# Patient Record
Sex: FEMALE | Race: WHITE | Employment: OTHER | URBAN - METROPOLITAN AREA
[De-identification: names, ages, dates, MRNs, and addresses within clinical notes are randomized per-mention and may not be internally consistent; named-entity substitution may affect disease eponyms.]

---

## 2020-05-10 ENCOUNTER — HOSPITAL ENCOUNTER (INPATIENT)
Age: 80
LOS: 3 days | Discharge: NURSING, REHAB OR LONG-TERM CARE | DRG: 065 | End: 2020-05-14
Attending: EMERGENCY MEDICINE | Admitting: INTERNAL MEDICINE
Payer: MEDICARE

## 2020-05-10 PROCEDURE — 99285 EMERGENCY DEPT VISIT HI MDM: CPT

## 2020-05-11 ENCOUNTER — APPOINTMENT (OUTPATIENT)
Dept: MRI IMAGING | Age: 80
DRG: 065 | End: 2020-05-11
Payer: MEDICARE

## 2020-05-11 ENCOUNTER — APPOINTMENT (OUTPATIENT)
Dept: CT IMAGING | Age: 80
DRG: 065 | End: 2020-05-11
Payer: MEDICARE

## 2020-05-11 ENCOUNTER — APPOINTMENT (OUTPATIENT)
Dept: GENERAL RADIOLOGY | Age: 80
DRG: 065 | End: 2020-05-11
Payer: MEDICARE

## 2020-05-11 PROBLEM — I63.9 ACUTE CEREBROVASCULAR ACCIDENT (CVA) (HCC): Status: ACTIVE | Noted: 2020-05-11

## 2020-05-11 PROBLEM — R47.01 BROCA'S APHASIA: Status: ACTIVE | Noted: 2020-05-11

## 2020-05-11 PROBLEM — E78.2 MIXED HYPERLIPIDEMIA: Status: ACTIVE | Noted: 2020-05-11

## 2020-05-11 LAB
-: NORMAL
ABSOLUTE EOS #: 0.1 K/UL (ref 0–0.4)
ABSOLUTE IMMATURE GRANULOCYTE: ABNORMAL K/UL (ref 0–0.3)
ABSOLUTE LYMPH #: 2.3 K/UL (ref 1–4.8)
ABSOLUTE MONO #: 0.5 K/UL (ref 0.1–1.3)
ALBUMIN SERPL-MCNC: 4.2 G/DL (ref 3.5–5.2)
ALBUMIN/GLOBULIN RATIO: ABNORMAL (ref 1–2.5)
ALP BLD-CCNC: 79 U/L (ref 35–104)
ALT SERPL-CCNC: 17 U/L (ref 5–33)
ANION GAP SERPL CALCULATED.3IONS-SCNC: 14 MMOL/L (ref 9–17)
ANION GAP SERPL CALCULATED.3IONS-SCNC: 15 MMOL/L (ref 9–17)
AST SERPL-CCNC: 18 U/L
BASOPHILS # BLD: 1 % (ref 0–2)
BASOPHILS ABSOLUTE: 0.1 K/UL (ref 0–0.2)
BILIRUB SERPL-MCNC: 0.65 MG/DL (ref 0.3–1.2)
BNP INTERPRETATION: ABNORMAL
BUN BLDV-MCNC: 16 MG/DL (ref 8–23)
BUN BLDV-MCNC: 17 MG/DL (ref 8–23)
BUN/CREAT BLD: ABNORMAL (ref 9–20)
BUN/CREAT BLD: ABNORMAL (ref 9–20)
CALCIUM SERPL-MCNC: 9.4 MG/DL (ref 8.6–10.4)
CALCIUM SERPL-MCNC: 9.8 MG/DL (ref 8.6–10.4)
CHLORIDE BLD-SCNC: 101 MMOL/L (ref 98–107)
CHLORIDE BLD-SCNC: 102 MMOL/L (ref 98–107)
CO2: 22 MMOL/L (ref 20–31)
CO2: 23 MMOL/L (ref 20–31)
CREAT SERPL-MCNC: 0.79 MG/DL (ref 0.5–0.9)
CREAT SERPL-MCNC: 0.88 MG/DL (ref 0.5–0.9)
DIFFERENTIAL TYPE: ABNORMAL
EOSINOPHILS RELATIVE PERCENT: 2 % (ref 0–4)
GFR AFRICAN AMERICAN: >60 ML/MIN
GFR AFRICAN AMERICAN: >60 ML/MIN
GFR NON-AFRICAN AMERICAN: 52 ML/MIN
GFR NON-AFRICAN AMERICAN: >60 ML/MIN
GFR NON-AFRICAN AMERICAN: >60 ML/MIN
GFR SERPL CREATININE-BSD FRML MDRD: >60 ML/MIN
GFR SERPL CREATININE-BSD FRML MDRD: ABNORMAL ML/MIN/{1.73_M2}
GLUCOSE BLD-MCNC: 105 MG/DL (ref 70–99)
GLUCOSE BLD-MCNC: 132 MG/DL (ref 70–99)
HCT VFR BLD CALC: 47.5 % (ref 36–46)
HEMOGLOBIN: 15.5 G/DL (ref 12–16)
IMMATURE GRANULOCYTES: ABNORMAL %
INR BLD: 1
LYMPHOCYTES # BLD: 41 % (ref 24–44)
MAGNESIUM: 2.1 MG/DL (ref 1.6–2.6)
MCH RBC QN AUTO: 29.7 PG (ref 26–34)
MCHC RBC AUTO-ENTMCNC: 32.6 G/DL (ref 31–37)
MCV RBC AUTO: 91.1 FL (ref 80–100)
MONOCYTES # BLD: 10 % (ref 1–7)
NRBC AUTOMATED: ABNORMAL PER 100 WBC
PARTIAL THROMBOPLASTIN TIME: 22.1 SEC (ref 24–36)
PDW BLD-RTO: 13.3 % (ref 11.5–14.9)
PLATELET # BLD: 342 K/UL (ref 150–450)
PLATELET ESTIMATE: ABNORMAL
PMV BLD AUTO: 7.3 FL (ref 6–12)
POC CREATININE: 1.02 MG/DL (ref 0.51–1.19)
POTASSIUM SERPL-SCNC: 3.3 MMOL/L (ref 3.7–5.3)
POTASSIUM SERPL-SCNC: 4.4 MMOL/L (ref 3.7–5.3)
PRO-BNP: 316 PG/ML
PROTHROMBIN TIME: 12.8 SEC (ref 11.8–14.6)
RBC # BLD: 5.21 M/UL (ref 4–5.2)
RBC # BLD: ABNORMAL 10*6/UL
REASON FOR REJECTION: NORMAL
SEG NEUTROPHILS: 46 % (ref 36–66)
SEGMENTED NEUTROPHILS ABSOLUTE COUNT: 2.5 K/UL (ref 1.3–9.1)
SODIUM BLD-SCNC: 138 MMOL/L (ref 135–144)
SODIUM BLD-SCNC: 139 MMOL/L (ref 135–144)
TOTAL PROTEIN: 7.6 G/DL (ref 6.4–8.3)
TROPONIN INTERP: NORMAL
TROPONIN INTERP: NORMAL
TROPONIN T: NORMAL NG/ML
TROPONIN T: NORMAL NG/ML
TROPONIN, HIGH SENSITIVITY: 10 NG/L (ref 0–14)
TROPONIN, HIGH SENSITIVITY: 9 NG/L (ref 0–14)
WBC # BLD: 5.5 K/UL (ref 3.5–11)
WBC # BLD: ABNORMAL 10*3/UL
ZZ NTE CLEAN UP: ORDERED TEST: NORMAL
ZZ NTE WITH NAME CLEAN UP: SPECIMEN SOURCE: NORMAL

## 2020-05-11 PROCEDURE — 80053 COMPREHEN METABOLIC PANEL: CPT

## 2020-05-11 PROCEDURE — 84484 ASSAY OF TROPONIN QUANT: CPT

## 2020-05-11 PROCEDURE — 2060000000 HC ICU INTERMEDIATE R&B

## 2020-05-11 PROCEDURE — 92610 EVALUATE SWALLOWING FUNCTION: CPT

## 2020-05-11 PROCEDURE — 83880 ASSAY OF NATRIURETIC PEPTIDE: CPT

## 2020-05-11 PROCEDURE — 99254 IP/OBS CNSLTJ NEW/EST MOD 60: CPT | Performed by: PSYCHIATRY & NEUROLOGY

## 2020-05-11 PROCEDURE — 85730 THROMBOPLASTIN TIME PARTIAL: CPT

## 2020-05-11 PROCEDURE — 97530 THERAPEUTIC ACTIVITIES: CPT

## 2020-05-11 PROCEDURE — 80048 BASIC METABOLIC PNL TOTAL CA: CPT

## 2020-05-11 PROCEDURE — 83735 ASSAY OF MAGNESIUM: CPT

## 2020-05-11 PROCEDURE — 6370000000 HC RX 637 (ALT 250 FOR IP): Performed by: PHYSICIAN ASSISTANT

## 2020-05-11 PROCEDURE — 85025 COMPLETE CBC W/AUTO DIFF WBC: CPT

## 2020-05-11 PROCEDURE — 2580000003 HC RX 258: Performed by: PHYSICIAN ASSISTANT

## 2020-05-11 PROCEDURE — 70450 CT HEAD/BRAIN W/O DYE: CPT

## 2020-05-11 PROCEDURE — 99223 1ST HOSP IP/OBS HIGH 75: CPT | Performed by: INTERNAL MEDICINE

## 2020-05-11 PROCEDURE — 70496 CT ANGIOGRAPHY HEAD: CPT

## 2020-05-11 PROCEDURE — 6360000004 HC RX CONTRAST MEDICATION: Performed by: EMERGENCY MEDICINE

## 2020-05-11 PROCEDURE — 82565 ASSAY OF CREATININE: CPT

## 2020-05-11 PROCEDURE — 97162 PT EVAL MOD COMPLEX 30 MIN: CPT

## 2020-05-11 PROCEDURE — 97166 OT EVAL MOD COMPLEX 45 MIN: CPT

## 2020-05-11 PROCEDURE — 71045 X-RAY EXAM CHEST 1 VIEW: CPT

## 2020-05-11 PROCEDURE — 2580000003 HC RX 258: Performed by: EMERGENCY MEDICINE

## 2020-05-11 PROCEDURE — 97116 GAIT TRAINING THERAPY: CPT

## 2020-05-11 PROCEDURE — 70551 MRI BRAIN STEM W/O DYE: CPT

## 2020-05-11 PROCEDURE — 6360000002 HC RX W HCPCS: Performed by: PHYSICIAN ASSISTANT

## 2020-05-11 PROCEDURE — 93005 ELECTROCARDIOGRAM TRACING: CPT | Performed by: EMERGENCY MEDICINE

## 2020-05-11 PROCEDURE — 6370000000 HC RX 637 (ALT 250 FOR IP): Performed by: INTERNAL MEDICINE

## 2020-05-11 PROCEDURE — 85610 PROTHROMBIN TIME: CPT

## 2020-05-11 PROCEDURE — 36415 COLL VENOUS BLD VENIPUNCTURE: CPT

## 2020-05-11 PROCEDURE — 6370000000 HC RX 637 (ALT 250 FOR IP): Performed by: EMERGENCY MEDICINE

## 2020-05-11 PROCEDURE — 92523 SPEECH SOUND LANG COMPREHEN: CPT

## 2020-05-11 RX ORDER — SODIUM CHLORIDE 0.9 % (FLUSH) 0.9 %
10 SYRINGE (ML) INJECTION EVERY 12 HOURS SCHEDULED
Status: DISCONTINUED | OUTPATIENT
Start: 2020-05-11 | End: 2020-05-14 | Stop reason: HOSPADM

## 2020-05-11 RX ORDER — 0.9 % SODIUM CHLORIDE 0.9 %
80 INTRAVENOUS SOLUTION INTRAVENOUS ONCE
Status: COMPLETED | OUTPATIENT
Start: 2020-05-11 | End: 2020-05-11

## 2020-05-11 RX ORDER — ATORVASTATIN CALCIUM 80 MG/1
80 TABLET, FILM COATED ORAL NIGHTLY
Status: DISCONTINUED | OUTPATIENT
Start: 2020-05-11 | End: 2020-05-14 | Stop reason: HOSPADM

## 2020-05-11 RX ORDER — ONDANSETRON 2 MG/ML
4 INJECTION INTRAMUSCULAR; INTRAVENOUS EVERY 6 HOURS PRN
Status: DISCONTINUED | OUTPATIENT
Start: 2020-05-11 | End: 2020-05-14 | Stop reason: HOSPADM

## 2020-05-11 RX ORDER — ASPIRIN 300 MG/1
300 SUPPOSITORY RECTAL DAILY
Status: DISCONTINUED | OUTPATIENT
Start: 2020-05-11 | End: 2020-05-14 | Stop reason: HOSPADM

## 2020-05-11 RX ORDER — ASPIRIN 81 MG/1
324 TABLET, CHEWABLE ORAL ONCE
Status: COMPLETED | OUTPATIENT
Start: 2020-05-11 | End: 2020-05-11

## 2020-05-11 RX ORDER — POLYETHYLENE GLYCOL 3350 17 G/17G
17 POWDER, FOR SOLUTION ORAL DAILY PRN
Status: DISCONTINUED | OUTPATIENT
Start: 2020-05-11 | End: 2020-05-14 | Stop reason: HOSPADM

## 2020-05-11 RX ORDER — ASPIRIN 81 MG/1
81 TABLET ORAL DAILY
Status: DISCONTINUED | OUTPATIENT
Start: 2020-05-11 | End: 2020-05-14 | Stop reason: HOSPADM

## 2020-05-11 RX ORDER — POTASSIUM CHLORIDE 20 MEQ/1
40 TABLET, EXTENDED RELEASE ORAL PRN
Status: DISCONTINUED | OUTPATIENT
Start: 2020-05-11 | End: 2020-05-14 | Stop reason: HOSPADM

## 2020-05-11 RX ORDER — SODIUM CHLORIDE 0.9 % (FLUSH) 0.9 %
10 SYRINGE (ML) INJECTION PRN
Status: DISCONTINUED | OUTPATIENT
Start: 2020-05-11 | End: 2020-05-11

## 2020-05-11 RX ORDER — SODIUM CHLORIDE 0.9 % (FLUSH) 0.9 %
10 SYRINGE (ML) INJECTION PRN
Status: DISCONTINUED | OUTPATIENT
Start: 2020-05-11 | End: 2020-05-14 | Stop reason: HOSPADM

## 2020-05-11 RX ORDER — POTASSIUM CHLORIDE 7.45 MG/ML
10 INJECTION INTRAVENOUS PRN
Status: DISCONTINUED | OUTPATIENT
Start: 2020-05-11 | End: 2020-05-14 | Stop reason: HOSPADM

## 2020-05-11 RX ORDER — CLOPIDOGREL BISULFATE 75 MG/1
75 TABLET ORAL DAILY
Status: DISCONTINUED | OUTPATIENT
Start: 2020-05-11 | End: 2020-05-14 | Stop reason: HOSPADM

## 2020-05-11 RX ORDER — PROMETHAZINE HYDROCHLORIDE 25 MG/1
12.5 TABLET ORAL EVERY 6 HOURS PRN
Status: DISCONTINUED | OUTPATIENT
Start: 2020-05-11 | End: 2020-05-14 | Stop reason: HOSPADM

## 2020-05-11 RX ADMIN — Medication 10 ML: at 00:22

## 2020-05-11 RX ADMIN — Medication 10 ML: at 14:19

## 2020-05-11 RX ADMIN — CLOPIDOGREL BISULFATE 75 MG: 75 TABLET ORAL at 14:19

## 2020-05-11 RX ADMIN — POTASSIUM BICARBONATE 40 MEQ: 782 TABLET, EFFERVESCENT ORAL at 02:58

## 2020-05-11 RX ADMIN — IOPAMIDOL 75 ML: 755 INJECTION, SOLUTION INTRAVENOUS at 00:22

## 2020-05-11 RX ADMIN — ATORVASTATIN CALCIUM 80 MG: 80 TABLET, FILM COATED ORAL at 22:05

## 2020-05-11 RX ADMIN — ASPIRIN 81 MG: 81 TABLET, COATED ORAL at 09:50

## 2020-05-11 RX ADMIN — ENOXAPARIN SODIUM 40 MG: 40 INJECTION SUBCUTANEOUS at 09:50

## 2020-05-11 RX ADMIN — ASPIRIN 81 MG 324 MG: 81 TABLET ORAL at 01:32

## 2020-05-11 RX ADMIN — Medication 10 ML: at 22:05

## 2020-05-11 RX ADMIN — SODIUM CHLORIDE 80 ML: 9 INJECTION, SOLUTION INTRAVENOUS at 00:21

## 2020-05-11 ASSESSMENT — PAIN SCALES - GENERAL
PAINLEVEL_OUTOF10: 0

## 2020-05-11 ASSESSMENT — ENCOUNTER SYMPTOMS
VOMITING: 0
COUGH: 0
NAUSEA: 0
COLOR CHANGE: 0
CHEST TIGHTNESS: 0
EYES NEGATIVE: 1
SHORTNESS OF BREATH: 0
RHINORRHEA: 0

## 2020-05-11 NOTE — PROGRESS NOTES
Physical Therapy    Facility/Department: [facility] PROGRESSIVE CARE  Initial Assessment    NAME: Sheron Bender  : 1940  MRN: [id number]    Date of Service: 2020    Discharge Recommendations:  Patient would benefit from continued therapy after discharge   PT Equipment Recommendations  Equipment Needed: (TBD)    Assessment   Body structures, Functions, Activity limitations: Decreased functional mobility ; Decreased endurance;Decreased strength;Decreased balance;Decreased safe awareness  Assessment: pt would benefit from intense PT at D/C, pt has a home goal and would be able to tolerate 3 hours of therapy (PT, OT and Speech). Treatment Diagnosis: impaired balance & aphasia due to a acute CVA  Specific instructions for Next Treatment: advance as tolerated, advance to AD at next treatment, FALL RISK  Prognosis: Excellent  Decision Making: Medium Complexity  History: admitted due to a acute CVA- pt has aphasia  Exam: ROM, MMT, balance and mobility assessments  Clinical Presentation: gait w/o AD 18' x 2 w/ mod x 1- HIGH FALL RISK, LOB when turning w/ assist to correct- aphasic, advance to AD at next treatment  PT Education: Goals;PT Role;Plan of Care;Gait Training;Equipment  Barriers to Learning: aphasia  REQUIRES PT FOLLOW UP: Yes  Activity Tolerance  Activity Tolerance: Patient limited by fatigue;Patient limited by endurance       Patient Diagnosis(es): The encounter diagnosis was Cerebrovascular accident (CVA), unspecified mechanism ([facility] [address] has a past medical history of Arthritis. has no past surgical history on file.     Restrictions  Restrictions/Precautions  Restrictions/Precautions: Up as Tolerated, Fall Risk(peripheral IV right antecubital)  Required Braces or Orthoses?: No  Vision/Hearing  Vision: Impaired  Vision Exceptions: Wears glasses for reading  Hearing: Within functional limits     Subjective  General  Patient assessed for rehabilitation services?: Yes  Response To Previous Treatment: Not (degrees)  LUE General AROM: see OT for UE assessment  Strength RLE  Comment: grossly 4-/5  Strength LLE  Comment: grossly 4/5  Strength RUE  Comment: see OT for UE assessment- right hand dominant  Strength LUE  Comment: see OT for UE assessment     Sensation  Overall Sensation Status: WFL(denies)  Bed mobility  Rolling to Right: Stand by assistance  Supine to Sit: Stand by assistance  Scooting: Stand by assistance  Comment: dangled at the EOB w/ CGA x 1- can be impulsive- has posterior lean when attempting LE MMT w/ assist needed to maintain balance; Pt had C/O dizziness when first dangled at the EOB  Transfers  Sit to Stand: Contact guard assistance  Stand to sit: Minimal Assistance  Stand Pivot Transfers: Moderate Assistance(LOB to the left)  Comment: pt left up in bedside chair w/ pull away alarm on for speech evaluation- nurse Catalina aware  Ambulation  Ambulation?: Yes  Ambulation 1  Surface: level tile  Device: No Device  Assistance:  Moderate assistance  Gait Deviations: Increased LILY  Distance: 18' x 2 into hallway and back  Comments: LOB w/ turning to the left w/ mod x 1 to assist, pt having difficulty following commands for left vs right cues; TRY ASSISTIVE DEVICE AT NEXT TREATMENT  Stairs/Curb  Stairs?: No     Balance  Sitting - Static: Good;-  Sitting - Dynamic: Fair;-  Standing - Static: Fair;+(no device)  Standing - Dynamic: Fair;-(no device)        Plan   Plan  Times per week: 1-2 times/ day for 5 days  Times per day: (1-2 times/ day for 5 days)  Specific instructions for Next Treatment: advance as tolerated, advance to AD at next treatment, FALL RISK  Current Treatment Recommendations: Strengthening, Gait Training, Patient/Caregiver Education & Training, Equipment Evaluation, Education, & procurement, Stair training, Balance Training, Functional Mobility Training, Endurance Training, Transfer Training, Safety Education & Training  Safety Devices  Type of devices: Left in chair, All fall risk

## 2020-05-11 NOTE — PROGRESS NOTES
Contacted Dr. Raymond Lemons via [employer] [facility] for new inpatient consult regarding CVA. Informed Dr. Raymond Lemons of patient location and RN taking care of her today. Message marked Read at 13:36 on 5/1/2020.

## 2020-05-11 NOTE — CARE COORDINATION
CATHLEEN received info that this patient and her family are interested in her going to the ARU for rehab. CATHLEEN did give the referral to [facility] [place] in admissions. SW waiting to hear whether or not she will be accepted. SW following.

## 2020-05-11 NOTE — PROGRESS NOTES
Dr Maryellen Goff vs,examines,speaks with pt; pt unable to answer d/t severe speech aphasia; pt able to freely sit self up at edge of bed for lunch; full use of upper extremities with no swallowing difficulties

## 2020-05-11 NOTE — ED PROVIDER NOTES
EMERGENCY DEPARTMENT ENCOUNTER    Pt Name: Netty Severin  MRN: [id number]  [place] 1940  Date of evaluation: 5/11/20  CHIEF COMPLAINT       Chief Complaint   Patient presents with    Aphasia     HISTORY OF PRESENT ILLNESS   HPI    HISTORY OF PRESENT ILLNESS:  Unknown past medical history given clinical condition presents for a chief complaint of aphasia. Patient has had difficulty speaking for greater in the past 2 days. She is only able to say yes or no to questions. She is able to understand what worsening. She denies any numbness or tingling or weakness anywhere else. No chest pain shortness of breath. Severity is moderate. No aggravating or relieving factors. Timing is 2 days. Course constant. Context is no trauma  -----------------------  -----------------------  REVIEW OF SYSTEMS  ED Caveat: [Clinical condition  -----------------------  -----------------------  ALLERGIES  -per nursing records, reviewed    PAST MEDICAL HISTORY  -See HPI    SOCIAL HISTORY  -No daily drinking, no IV drugs  -----------------------  -----------------------  PHYSICAL EXAM  Gen: Alert, no acute distress  Skin: Warm, no rashes  Head: Normocephalic, atraumatic  Neck: No midline tenderness, no nuchal rigidity  Eye: EOMI, PERRLA, normal conjunctiva  ENT: Mucous membranes moist, no pharyngeal erythema  CV: Normal rate, no rubs  Resp: Respirations unlabored, lungs clear to auscultation  GI: Soft, non distended, no large abdominal masses, non tender  MSK: No midline back pain, no large joint effusions  Neuro: Alert and oriented, expressive aphasia  Psych: Cooperative, appropriate mood and affect  -----------------------  -----------------------  Pt seen at: 0000  Code stroke called at: [not called   Last known normal: [>24 hour  Is pt an endovascular candidate?  [no]  -----------------------  NIH STROKE SCALE = [2]  -- 1a Level of consciousness: --  [x] Alert, keenly responsive (0)  [] Not alert, but arousable by minor stimulation

## 2020-05-11 NOTE — H&P
[address] [facility]     HISTORY AND PHYSICAL EXAMINATION            Date:   5/11/2020  Patientname:  Wendy Cormier  Date of admission:  5/10/2020 11:59 PM  MRN:   [id number]  Account:  [de-identified]  YOB: 1940  PCP:    No primary care provider on file. Room:   [address] [place] [address]  Code Status:    No Order    CHIEF COMPLAINT     Chief Complaint   Patient presents with    Aphasia       HISTORY OF PRESENT ILLNESS  (Character, Onset, Location, Duration,  Exacerbating/RelievingFactors, Radiation,   Associated Symptoms, Severity )      The patient is a 78 y.o. female who presented to the ED on 5/10/2020 for evaluation of difficulty speaking. Per patient's son symptoms started approximately 2 PM.  Patient has difficulty communicating and answering writer's questions. She is able to understand questions and answer with yes or no however has difficulty finding the correct words for more elaborate answers. When asked directly she denies chest pain, shortness of breath, fever, chills, fall, trauma, abdominal pain, nausea, vomiting, numbness, tingling, headache or diarrhea. While in the ED patient had a CT of the head without contrast found asymmetric hypoattenuation of the left parietal temporal lobe likely representing acute ischemia. Otherwise unremarkable CTA of the head and neck also in the ED. Stroke alert was called in ED, however patient was outside the window for TPA. PAST MEDICAL HISTORY   Patient  has a past medical history of Arthritis. PAST SURGICAL HISTORY    Patient  has no past surgical history on file. FAMILY HISTORY    Patient family history is not on file. SOCIAL HISTORY    Patient  reports that she has never smoked. She has never used smokeless tobacco. She reports previous alcohol use. She reports that she does not use drugs. HOME MEDICATIONS        Prior to Admission medications    Not on File       ALLERGIES      Patient has no known allergies.     REVIEW OF SYSTEMS     Review of Systems   Constitutional: Negative for chills and fever. HENT: Negative for congestion and rhinorrhea. Eyes: Negative. Respiratory: Negative for cough, chest tightness and shortness of breath. Cardiovascular: Negative for chest pain and leg swelling. Gastrointestinal: Negative for nausea and vomiting. Skin: Negative for color change and rash. Neurological: Positive for speech difficulty. Negative for dizziness, weakness and numbness. PHYSICAL EXAM      BP (!) 140/95   Pulse 102   Temp 97.4 °F (36.3 °C) (Temporal)   Resp 20   Wt 170 lb (77.1 kg)   SpO2 96%  There is no height or weight on file to calculate BMI. Physical Exam  Constitutional:       Appearance: She is well-developed. HENT:      Head: Normocephalic and atraumatic. Eyes:      Extraocular Movements: Extraocular movements intact. Neck:      Musculoskeletal: Normal range of motion and neck supple. Cardiovascular:      Rate and Rhythm: Normal rate. Heart sounds: Normal heart sounds. No murmur. Pulmonary:      Effort: Pulmonary effort is normal.   Abdominal:      Palpations: Abdomen is soft. Skin:     General: Skin is warm and dry. Findings: No rash. Neurological:      Mental Status: She is alert and oriented to person, place, and time. Sensory: Sensation is intact. No sensory deficit. Motor: Motor function is intact. No weakness. Comments: Obvious expressive aphasia   Psychiatric:         Behavior: Behavior normal.       DIAGNOSTICS      EKG: EKG: normal sinus rhythm, prolonged QT interval.    Labs:  CBC:   Recent Labs     05/11/20  0001   WBC 5.5   HGB 15.5        BMP:    Recent Labs     05/11/20  0001 05/11/20  0004     --    K 3.3*  --      --    CO2 22  --    BUN 17  --    CREATININE 0.88 1.02   GLUCOSE 132*  --      S. Calcium:  Recent Labs     05/11/20  0001   CALCIUM 9.8     S.  Ionized Calcium:No results for input(s): IONCA in the last 72 hours.  S. Magnesium:  Recent Labs     05/11/20  0001   MG 2.1     S. Phosphorus:No results for input(s): PHOS in the last 72 hours. S. Glucose:No results for input(s): POCGLU in the last 72 hours. Glycosylated hemoglobin A1C: No results found for: LABA1C  Hepatic:   Recent Labs     05/11/20  0001   AST 18   ALT 17   ALKPHOS 79     CARDIAC ENZY:   Recent Labs     05/11/20  0001   TROPHS 9     INR:   Recent Labs     05/11/20  0026   INR 1.0     BNP:   Recent Labs     05/11/20  0001   PROBNP 316*      ABGs: No results for input(s): PH, PCO2, PO2, HCO3, O2SAT in the last 72 hours. Lipids: No results for input(s): CHOL, TRIG, HDL, LDLCALC in the last 72 hours. Invalid input(s): LDL  Pancreatic functions:No results for input(s): LIPASE, AMYLASE in the last 72 hours. Devi Potash: No results for input(s): LACTA in the last 72 hours. Thyroid functions: No results found for: TSH   U/A:No results for input(s): NITRITE, COLORU, WBCUA, RBCUA, MUCUS, BACTERIA, CLARITYU, SPECGRAV, LEUKOCYTESUR, BLOODU, GLUCOSEU, AMORPHOUS in the last 72 hours. Invalid input(s): Daphine Mole    Imaging/Diagonstics:     Ct Head Wo Contrast    Result Date: 5/11/2020  EXAMINATION: CT OF THE HEAD WITHOUT CONTRAST  5/11/2020 12:07 am TECHNIQUE: CT of the head was performed without the administration of intravenous contrast. Dose modulation, iterative reconstruction, and/or weight based adjustment of the mA/kV was utilized to reduce the radiation dose to as low as reasonably achievable. COMPARISON: None. HISTORY: ORDERING SYSTEM PROVIDED HISTORY: HA TECHNOLOGIST PROVIDED HISTORY: HA Reason for Exam: stroke alert Acuity: Unknown Type of Exam: Unknown FINDINGS: BRAIN/VENTRICLES: There is no acute intracranial hemorrhage, mass effect or midline shift. No abnormal extra-axial fluid collection. The gray-white differentiation is maintained without evidence of an acute infarct. There is no evidence of hydrocephalus.  Asymmetric hypoattenuation is noted involving the left parietal temporal lobe concerning for presence of acute ischemia. ORBITS: The visualized portion of the orbits demonstrate no acute abnormality. SINUSES: The visualized paranasal sinuses and mastoid air cells demonstrate no acute abnormality. SOFT TISSUES/SKULL:  No acute abnormality of the visualized skull or soft tissues. Asymmetric hypoattenuation involving the left parietal temporal lobe concerning for presence of acute ischemia. Recommend MRI brain with diffusion-weighted imaging for further evaluation. Findings were discussed with Dr. Naun Mason At 12:18 am on 5/11/2020. Xr Chest Portable    Result Date: 5/11/2020  EXAMINATION: ONE XRAY VIEW OF THE CHEST 5/11/2020 12:50 am COMPARISON: None. HISTORY: ORDERING SYSTEM PROVIDED HISTORY: cough TECHNOLOGIST PROVIDED HISTORY: cough Reason for Exam: cough Acuity: Acute Type of Exam: Initial Additional signs and symptoms: Stroke protocol Relevant Medical/Surgical History: Stroke protocol FINDINGS: The heart is normal in size and configuration. The mediastinal contours are within normal limits. The lungs are well aerated. The pleural surfaces are normal and no evidence of a pleural effusion is seen. Bones and soft tissues are unremarkable. Unremarkable AP upright portable view of the chest.     Cta Head Neck W Contrast    Addendum Date: 5/11/2020    ADDENDUM: Three-dimensional reconstructed imaging of the head and neck arterial vasculature was provided after original interpretation of the examination. No interval change. Result Date: 5/11/2020  EXAMINATION: CTA OF THE HEAD AND NECK WITH CONTRAST 5/11/2020 12:21 am: TECHNIQUE: CTA of the head and neck was performed with the administration of intravenous contrast. Multiplanar reformatted images are provided for review. MIP images are provided for review. Stenosis of the internal carotid arteries measured using NASCET criteria.  Dose modulation, iterative reconstruction, and/or weight aphasia in the ED and had a CT head which showed likely ischemia. - Patient was outside of TPA window upon presentation  - Neurology consult, will appreciate their recommendations  - Given Aspirin 324 in ED, daily 81 mg starting this morning  - Nightly Atorvastatin 80 mg    Hypokalemia  - K of 3.3 on BMP, replace per protocol    DVT PPx- Lovenox  Diet- General  SLP, OT and PT to evaluate and treat    Consultations:     IP CONSULT TO [place], [place]   5/11/2020  2:04 AM    [address] Armin Amato [address] [place] [address], [address] [place] [address]. Phone [phone]     Attending Physician Statement  I have discussed the care of Giuseppe Matthews and I have examined the patient myselft and taken ros and hpi , including pertinent history and exam findings,  with the resident. I have reviewed the key elements of all parts of the encounter with the resident. I agree with the assessment, plan and orders as documented by the resident.   Acute ischemic CVA of the left left temporoparietal area significant severe Broca's aphasia and ataxia carotid  No significant blockage out of TPA window will do aspirin Plavix and Lipitor neurology consult speech therapy and eval and treat occupational physical therapy hypokalemia replace    Electronically signed by Janette Jean-Baptiste MD

## 2020-05-11 NOTE — CONSULTS
history. Social History: Willian Huddleston  reports that she has never smoked. She has never used smokeless tobacco. She reports previous alcohol use. She reports that she does not use drugs. History reviewed. No pertinent family history. Current Medications:     sodium chloride flush  10 mL Intravenous 2 times per day    enoxaparin  40 mg Subcutaneous Daily    aspirin  81 mg Oral Daily    Or    aspirin  300 mg Rectal Daily    atorvastatin  80 mg Oral Nightly    clopidogrel  75 mg Oral Daily     PRN Meds include: sodium chloride flush, polyethylene glycol, promethazine **OR** ondansetron, potassium chloride **OR** potassium alternative oral replacement **OR** potassium chloride    ROS: Could not be obtained due to patient's condition. Objective:   BP (!) 151/96   Pulse 101   Temp 98.7 °F (37.1 °C) (Oral)   Resp 16   Ht 5' 6\" (1.676 m)   Wt 172 lb 13.5 oz (78.4 kg)   SpO2 97%   BMI 27.90 kg/m²     Blood pressure range: Systolic (25CGA), ANTWAN:234 , Min:139 , HPV:357   ; Diastolic (89RLA), TTO:45, Min:77, Max:96      Continuous infusions:     ON EXAMINATION:  GENERAL  Appears comfortable and in no distress   HEENT  NC/ AT   NECK  Supple and no bruits heard   Cardiovascular  S1, S2 heard; radial pulse intact   MENTAL STATUS:  Alert, oriented x self only; not oriented to place, month and year; able to follow two-step commands only; speech exam significant for global aphasia with predominant expressive aphasia greater than receptive aphasia; no hallucinations or delusions. CRANIAL NERVES: II     -       Pupils reactive b/l.,  Blinks to threat bilaterally. III,IV,VI -  EOMs full, no afferent defect, no SHILA, no ptosis  V     -     Normal facial sensation  VII    -     Normal facial symmetry  VIII, IX,X, XI, XII   -     could not be assessed due to aphasia.      MOTOR FUNCTION:  Moves both upper and lower extremities spontaneously and purposefully equally well bilaterally with normal bulk and normal tone
etc.  4. Support: Clarify, will need 24/7 due to cognitive deficits-aphasia  5. Rehab recommendation: Would benefit from acute inpatient rehabilitation when medically/neurologically ready if has 24/7 care on eventual discharge  6. DVT proph: Lovenox     Please call with questions. America Holloway. Mitchel Meigs, MD          This note is created with the assistance of a speech recognition program.  While intending to generate a document that actually reflects the content of the visit, the document can still have some errors including those of syntax and sound a like substitutions which may escape proof reading.   In such instances, actual meaning can be extrapolated by contextual diversion

## 2020-05-11 NOTE — PROGRESS NOTES
Admitted to room 2109 from ER per cart. Transferred self from cart to bed. Telemetry applied Oriented to room and call light. Vitals and assessment completed. No distress noted.

## 2020-05-12 LAB
ANION GAP SERPL CALCULATED.3IONS-SCNC: 14 MMOL/L (ref 9–17)
BUN BLDV-MCNC: 22 MG/DL (ref 8–23)
BUN/CREAT BLD: ABNORMAL (ref 9–20)
CALCIUM SERPL-MCNC: 9.3 MG/DL (ref 8.6–10.4)
CHLORIDE BLD-SCNC: 104 MMOL/L (ref 98–107)
CHOLESTEROL/HDL RATIO: 2.4
CHOLESTEROL: 237 MG/DL
CO2: 23 MMOL/L (ref 20–31)
CREAT SERPL-MCNC: 0.96 MG/DL (ref 0.5–0.9)
EKG ATRIAL RATE: 89 BPM
EKG P AXIS: 24 DEGREES
EKG P-R INTERVAL: 130 MS
EKG Q-T INTERVAL: 440 MS
EKG QRS DURATION: 92 MS
EKG QTC CALCULATION (BAZETT): 535 MS
EKG R AXIS: -10 DEGREES
EKG T AXIS: 82 DEGREES
EKG VENTRICULAR RATE: 89 BPM
ESTIMATED AVERAGE GLUCOSE: 103 MG/DL
GFR AFRICAN AMERICAN: >60 ML/MIN
GFR NON-AFRICAN AMERICAN: 56 ML/MIN
GFR SERPL CREATININE-BSD FRML MDRD: ABNORMAL ML/MIN/{1.73_M2}
GFR SERPL CREATININE-BSD FRML MDRD: ABNORMAL ML/MIN/{1.73_M2}
GLUCOSE BLD-MCNC: 112 MG/DL (ref 70–99)
HBA1C MFR BLD: 5.2 % (ref 4–6)
HCT VFR BLD CALC: 43.4 % (ref 36–46)
HDLC SERPL-MCNC: 100 MG/DL
HEMOGLOBIN: 14.4 G/DL (ref 12–16)
LDL CHOLESTEROL: 121 MG/DL (ref 0–130)
LV EF: 65 %
LVEF MODALITY: NORMAL
MCH RBC QN AUTO: 30.2 PG (ref 26–34)
MCHC RBC AUTO-ENTMCNC: 33.3 G/DL (ref 31–37)
MCV RBC AUTO: 90.7 FL (ref 80–100)
NRBC AUTOMATED: NORMAL PER 100 WBC
PDW BLD-RTO: 13.3 % (ref 11.5–14.9)
PLATELET # BLD: 287 K/UL (ref 150–450)
PMV BLD AUTO: 7.3 FL (ref 6–12)
POTASSIUM SERPL-SCNC: 3.8 MMOL/L (ref 3.7–5.3)
RBC # BLD: 4.79 M/UL (ref 4–5.2)
SODIUM BLD-SCNC: 141 MMOL/L (ref 135–144)
TRIGL SERPL-MCNC: 82 MG/DL
VLDLC SERPL CALC-MCNC: ABNORMAL MG/DL (ref 1–30)
WBC # BLD: 4.4 K/UL (ref 3.5–11)

## 2020-05-12 PROCEDURE — 6360000002 HC RX W HCPCS: Performed by: PHYSICIAN ASSISTANT

## 2020-05-12 PROCEDURE — 2580000003 HC RX 258: Performed by: PHYSICIAN ASSISTANT

## 2020-05-12 PROCEDURE — C8929 TTE W OR WO FOL WCON,DOPPLER: HCPCS

## 2020-05-12 PROCEDURE — 92507 TX SP LANG VOICE COMM INDIV: CPT

## 2020-05-12 PROCEDURE — 80061 LIPID PANEL: CPT

## 2020-05-12 PROCEDURE — 97535 SELF CARE MNGMENT TRAINING: CPT

## 2020-05-12 PROCEDURE — 6360000004 HC RX CONTRAST MEDICATION: Performed by: INTERNAL MEDICINE

## 2020-05-12 PROCEDURE — 6370000000 HC RX 637 (ALT 250 FOR IP): Performed by: PHYSICIAN ASSISTANT

## 2020-05-12 PROCEDURE — 97110 THERAPEUTIC EXERCISES: CPT

## 2020-05-12 PROCEDURE — 80048 BASIC METABOLIC PNL TOTAL CA: CPT

## 2020-05-12 PROCEDURE — 99233 SBSQ HOSP IP/OBS HIGH 50: CPT | Performed by: PSYCHIATRY & NEUROLOGY

## 2020-05-12 PROCEDURE — 85027 COMPLETE CBC AUTOMATED: CPT

## 2020-05-12 PROCEDURE — 83036 HEMOGLOBIN GLYCOSYLATED A1C: CPT

## 2020-05-12 PROCEDURE — 99232 SBSQ HOSP IP/OBS MODERATE 35: CPT | Performed by: INTERNAL MEDICINE

## 2020-05-12 PROCEDURE — 97530 THERAPEUTIC ACTIVITIES: CPT

## 2020-05-12 PROCEDURE — 36415 COLL VENOUS BLD VENIPUNCTURE: CPT

## 2020-05-12 PROCEDURE — 93010 ELECTROCARDIOGRAM REPORT: CPT | Performed by: INTERNAL MEDICINE

## 2020-05-12 PROCEDURE — 6370000000 HC RX 637 (ALT 250 FOR IP): Performed by: INTERNAL MEDICINE

## 2020-05-12 PROCEDURE — 2060000000 HC ICU INTERMEDIATE R&B

## 2020-05-12 RX ORDER — CLOPIDOGREL BISULFATE 75 MG/1
75 TABLET ORAL DAILY
Status: CANCELLED | OUTPATIENT
Start: 2020-05-13

## 2020-05-12 RX ORDER — POLYETHYLENE GLYCOL 3350 17 G/17G
17 POWDER, FOR SOLUTION ORAL DAILY PRN
Status: CANCELLED | OUTPATIENT
Start: 2020-05-12

## 2020-05-12 RX ORDER — ATORVASTATIN CALCIUM 80 MG/1
80 TABLET, FILM COATED ORAL NIGHTLY
Status: CANCELLED | OUTPATIENT
Start: 2020-05-12

## 2020-05-12 RX ADMIN — PERFLUTREN 2.2 MG: 6.52 INJECTION, SUSPENSION INTRAVENOUS at 11:28

## 2020-05-12 RX ADMIN — Medication 10 ML: at 09:32

## 2020-05-12 RX ADMIN — ATORVASTATIN CALCIUM 80 MG: 80 TABLET, FILM COATED ORAL at 20:48

## 2020-05-12 RX ADMIN — ENOXAPARIN SODIUM 40 MG: 40 INJECTION SUBCUTANEOUS at 09:30

## 2020-05-12 RX ADMIN — Medication 10 ML: at 20:48

## 2020-05-12 RX ADMIN — ASPIRIN 81 MG: 81 TABLET, COATED ORAL at 09:30

## 2020-05-12 RX ADMIN — CLOPIDOGREL BISULFATE 75 MG: 75 TABLET ORAL at 09:30

## 2020-05-12 NOTE — CARE COORDINATION
DISCHARGE PLANNING NOTE:    Plan is for this patient to go to [place] [address]. It appears from Dr. Yeny Burris note that patient is appropriate - No precert required. LSW following. +CVA - Expressive Aphasia. Will continue to follow along.      Electronically signed by Ramu Dominguez RN on 5/12/2020 at 1:45 PM

## 2020-05-12 NOTE — PROGRESS NOTES
x5  Activity: ADLs/mobility  Comment: unsteady, impulsive, R neglect  Functional Mobility  Functional - Mobility Device: No device  Activity: Other(from EOB>bedside chair>bathrooom> EOB)  Assist Level: Moderate assistance  Functional Mobility Comments: quick/impulsive movements, VC for safety, VERY unsteady on feet requiring consistent Mod A for balance; Physical A to veer from environmental obstacles on r side   ADL  Feeding: Setup  Grooming: Setup  UE Bathing: Stand by assistance  LE Bathing: Minimal assistance  UE Dressing: Stand by assistance  LE Dressing: Minimal assistance  Toileting: Minimal assistance  Additional Comments: Full ADL. VC for pacing and use of seated tasks for fall prevention. Pt c P insight, recepetion and carryover. Pt noted to use item improperly requriring cuing and SBA/Min as well as A for balance c dynamic standing tasks. Transfers  Sit to stand: Contact guard assistance  Stand to sit: Contact guard assistance  Transfer Comments: from EOB/toilet/bedside chair; VC for slow/controlled movements, can be quick moving/impulsive   Toilet Transfers  Toilet - Technique: Ambulating  Equipment Used: Grab bars  Toilet Transfer: Minimal assistance  Toilet Transfers Comments: steady A, gestural cuing for hand placement           Additional Activities: Educated on R neglect, pacing self c mobility and tasks as well as elevated fall risk. Pt continues V/D P insight. Assessment  Performance deficits / Impairments: Decreased functional mobility ; Decreased endurance;Decreased strength;Decreased ADL status; Decreased safe awareness;Decreased balance  Assessment: remains limited by P insight, balance deficits, and fxl I  Prognosis: Good  Activity Tolerance: Patient Tolerated treatment well  Safety Devices in place: Yes  Type of devices: Left in chair;Call light within reach;Gait belt;Nurse notified; Chair alarm in place(pts son present in room upon OT exit)             Patient Education:

## 2020-05-12 NOTE — PROGRESS NOTES
Medical History:   Diagnosis Date    Arthritis        History reviewed. No pertinent surgical history. Social History: Jania Cisse  reports that she has never smoked. She has never used smokeless tobacco. She reports previous alcohol use. She reports that she does not use drugs. History reviewed. No pertinent family history. Current Medications:     sodium chloride flush  10 mL Intravenous 2 times per day    enoxaparin  40 mg Subcutaneous Daily    aspirin  81 mg Oral Daily    Or    aspirin  300 mg Rectal Daily    atorvastatin  80 mg Oral Nightly    clopidogrel  75 mg Oral Daily     PRN Meds include: sodium chloride flush, polyethylene glycol, promethazine **OR** ondansetron, potassium chloride **OR** potassium alternative oral replacement **OR** potassium chloride    ROS: Could not be obtained due to patient's condition. Objective:   /63   Pulse 95   Temp 98.8 °F (37.1 °C) (Oral)   Resp 18   Ht 5' 6\" (1.676 m)   Wt 172 lb 13.5 oz (78.4 kg)   SpO2 97%   BMI 27.90 kg/m²     Blood pressure range: Systolic (86AUV), GXY:219 , Min:120 , FRT:664   ; Diastolic (44NWL), JOHN:67, Min:61, Max:96      Continuous infusions:     ON EXAMINATION:  GENERAL  Appears comfortable and in no distress   HEENT  NC/ AT   NECK  Supple and no bruits heard   Cardiovascular  S1, S2 heard; radial pulse intact   MENTAL STATUS:  Alert, oriented x self only; not oriented to place, month and year; able to follow three-step commands; speech exam significant for paraphasias and perseveration with global aphasia with predominant expressive aphasia greater than receptive aphasia; no hallucinations or delusions. CRANIAL NERVES: II     -       Pupils reactive b/l.,  Blinks to threat bilaterally. III,IV,VI -  EOMs full, no afferent defect, no SHILA, no ptosis  V     -     Normal facial sensation  VII    -     Normal facial symmetry  VIII, IX,X, XI, XII   -     could not be assessed due to aphasia.      MOTOR FUNCTION:  Moves shunt. Impression and Plan: Ms. Eric Hendricks is a 78 y.o. female with   Acute left parietal lobe ischemic infarct; additional small infarct in right parietal lobe  Four day hx of aphasia; exam significant for expressive > receptive aphasia secondary to above  Loaded with aspirin and continued on aspirin 81 mg daily, Plavix 75 mg daily, atorvastatin 80 mg nightly. Permissive hypertension with target systolic not lower than [phone]. Cont PT/ OT/ speech therapy   passed bedside swallow and presently on regular diet. Care plan is discussed with patient's Nurse. Will follow with you.           Kristy Gonzalez MD 5/12/2020 7:06 PM

## 2020-05-12 NOTE — DISCHARGE SUMMARY
[facility] Internal Medicine    Discharge Summary     Patient ID: Netty Severin  :  1940   MRN: [id number]     ACCOUNT:  [de-identified]   Patient's PCP: No primary care provider on file. Admit Date: 5/10/2020   Discharge Date: 2020    Length of Stay: 1  Code Status:  Full Code  Admitting Physician: Kamari Bundy MD  Discharge Physician: Kamari Bundy MD     Active Discharge Diagnoses:     Primary Problem  Acute cerebrovascular accident (CVA) [facility])      [facility] Problems    Diagnosis Date Noted    Acute cerebrovascular accident (CVA) ([facility] [address] [I63.9] 2020    Mixed hyperlipidemia [E78.2] 2020    Broca's aphasia [R47.01] 2020       Admission Condition:  fair     Discharged Condition: fair    Hospital Stay:     Hospital Course:  Netty Severin is a 78 y.o. female who was admitted for the management of Acute cerebrovascular accident (CVA) ([facility] [address] , presented to ER with Aphasia  22-year-old elderly lady very pleasant admitted with a difficulty speech expressive and receptive aphasia diagnosed as an acute ischemic infarct of the left temporoparietal lobe she is medically managed no carotid significant stenosis noted neurology was consulted aspirin Plavix and Lipitor patient found to be a good candidate for acute rehab discharged to acute rehab at [facility] with medical risk reduction        Significant therapeutic interventions:     Significant Diagnostic Studies:   Labs / Micro:        ,     Radiology:    Ct Head Wo Contrast    Result Date: 2020  EXAMINATION: CT OF THE HEAD WITHOUT CONTRAST  2020 12:07 am TECHNIQUE: CT of the head was performed without the administration of intravenous contrast. Dose modulation, iterative reconstruction, and/or weight based adjustment of the mA/kV was utilized to reduce the radiation dose to as low as reasonably achievable. COMPARISON: None.  HISTORY: ORDERING SYSTEM PROVIDED HISTORY: HA [facility]    Physician Follow Up:     No follow-up provider specified. Requiring Further Evaluation/Follow Up POST HOSPITALIZATION/Incidental Findings:    Diet: cardiac diet    Activity: As tolerated    Instructions to Patient:     Discharge Medications:      Medication List      You have not been prescribed any medications. Aspirin 81 mg   Plavix 75 mg   Lipitor 80 mg   with other medications reconciled for discharge readmit patient to be discharged on all 3 of them unless there is bleeding    Time Spent on discharge is  35 mins in patient examination, evaluation, counseling as well as medication reconciliation, prescriptions for required medications, discharge plan and follow up. Electronically signed by   Penny Hidalgo MD  5/12/2020  3:54 PM      Thank you Dr. Tato Gordon primary care provider on file. for the opportunity to be involved in this patient's care.

## 2020-05-13 LAB
ANION GAP SERPL CALCULATED.3IONS-SCNC: 14 MMOL/L (ref 9–17)
BUN BLDV-MCNC: 29 MG/DL (ref 8–23)
BUN/CREAT BLD: ABNORMAL (ref 9–20)
CALCIUM SERPL-MCNC: 9.6 MG/DL (ref 8.6–10.4)
CHLORIDE BLD-SCNC: 104 MMOL/L (ref 98–107)
CO2: 23 MMOL/L (ref 20–31)
CREAT SERPL-MCNC: 1.08 MG/DL (ref 0.5–0.9)
GFR AFRICAN AMERICAN: 59 ML/MIN
GFR NON-AFRICAN AMERICAN: 49 ML/MIN
GFR SERPL CREATININE-BSD FRML MDRD: ABNORMAL ML/MIN/{1.73_M2}
GFR SERPL CREATININE-BSD FRML MDRD: ABNORMAL ML/MIN/{1.73_M2}
GLUCOSE BLD-MCNC: 121 MG/DL (ref 70–99)
HCT VFR BLD CALC: 45.2 % (ref 36–46)
HEMOGLOBIN: 14.9 G/DL (ref 12–16)
MCH RBC QN AUTO: 29.9 PG (ref 26–34)
MCHC RBC AUTO-ENTMCNC: 33 G/DL (ref 31–37)
MCV RBC AUTO: 90.7 FL (ref 80–100)
NRBC AUTOMATED: NORMAL PER 100 WBC
PDW BLD-RTO: 13.6 % (ref 11.5–14.9)
PLATELET # BLD: 305 K/UL (ref 150–450)
PMV BLD AUTO: 7.5 FL (ref 6–12)
POTASSIUM SERPL-SCNC: 3.6 MMOL/L (ref 3.7–5.3)
RBC # BLD: 4.99 M/UL (ref 4–5.2)
SODIUM BLD-SCNC: 141 MMOL/L (ref 135–144)
WBC # BLD: 6.6 K/UL (ref 3.5–11)

## 2020-05-13 PROCEDURE — 6370000000 HC RX 637 (ALT 250 FOR IP): Performed by: PHYSICIAN ASSISTANT

## 2020-05-13 PROCEDURE — 85027 COMPLETE CBC AUTOMATED: CPT

## 2020-05-13 PROCEDURE — 80048 BASIC METABOLIC PNL TOTAL CA: CPT

## 2020-05-13 PROCEDURE — 6370000000 HC RX 637 (ALT 250 FOR IP): Performed by: INTERNAL MEDICINE

## 2020-05-13 PROCEDURE — 6360000002 HC RX W HCPCS: Performed by: PHYSICIAN ASSISTANT

## 2020-05-13 PROCEDURE — 92507 TX SP LANG VOICE COMM INDIV: CPT

## 2020-05-13 PROCEDURE — 99233 SBSQ HOSP IP/OBS HIGH 50: CPT | Performed by: PSYCHIATRY & NEUROLOGY

## 2020-05-13 PROCEDURE — 2060000000 HC ICU INTERMEDIATE R&B

## 2020-05-13 PROCEDURE — 99239 HOSP IP/OBS DSCHRG MGMT >30: CPT | Performed by: INTERNAL MEDICINE

## 2020-05-13 PROCEDURE — 6360000002 HC RX W HCPCS: Performed by: NURSE PRACTITIONER

## 2020-05-13 PROCEDURE — 36415 COLL VENOUS BLD VENIPUNCTURE: CPT

## 2020-05-13 RX ORDER — LORAZEPAM 2 MG/ML
1 INJECTION INTRAMUSCULAR ONCE
Status: COMPLETED | OUTPATIENT
Start: 2020-05-13 | End: 2020-05-13

## 2020-05-13 RX ADMIN — ENOXAPARIN SODIUM 40 MG: 40 INJECTION SUBCUTANEOUS at 09:49

## 2020-05-13 RX ADMIN — LORAZEPAM 1 MG: 2 INJECTION INTRAMUSCULAR; INTRAVENOUS at 02:58

## 2020-05-13 RX ADMIN — CLOPIDOGREL BISULFATE 75 MG: 75 TABLET ORAL at 09:49

## 2020-05-13 RX ADMIN — ATORVASTATIN CALCIUM 80 MG: 80 TABLET, FILM COATED ORAL at 20:41

## 2020-05-13 RX ADMIN — ASPIRIN 81 MG: 81 TABLET, COATED ORAL at 09:49

## 2020-05-13 ASSESSMENT — ENCOUNTER SYMPTOMS
EYES NEGATIVE: 1
SHORTNESS OF BREATH: 0
NAUSEA: 0
COUGH: 0
VOMITING: 0
RHINORRHEA: 0
CHEST TIGHTNESS: 0
COLOR CHANGE: 0

## 2020-05-13 ASSESSMENT — PAIN SCALES - GENERAL
PAINLEVEL_OUTOF10: 0
PAINLEVEL_OUTOF10: 0

## 2020-05-13 NOTE — FLOWSHEET NOTE
Writer spoke with patient's grandson on the telephone; follow up call made to determine if patient has ACP documents; patient's son, Cheryl Matute, not present; writer attempted to call son and there was no answer;      05/13/20 1469   Encounter Summary   Services provided to: Family   Referral/Consult From: [facility] Family members   Continue Visiting   (5/13/20)   Complexity of Encounter Low   Length of Encounter 15 minutes   Spiritual Assessment Completed Yes   Grief and Life Adjustment   Type Adjustment to illness   Assessment Approachable; Hopeful;Coping;Helplessness   Intervention Active listening;Sustaining presence/ Ministry of presence   Outcome Expressed gratitude;Engaged in conversation;Expressed feelings/needs/concerns;Coping; Hopeful;Receptive

## 2020-05-13 NOTE — PROGRESS NOTES
Physical Therapy  DATE: 2020    NAME: Loris Snellen  MRN: [id number]   : 1940    Patient not seen this date for Physical Therapy due to:  [] Blood transfusion in progress  [] Hemodialysis  []  Patient Declined  [] Spine Precautions   [] Strict Bedrest  [] Surgery/ Procedure  [] Testing      [x] Other 2020- deferred as pt was medicated w/ ativan due to agitation last evening. Pt sleeping currently. Deferred per nurse Soha Renia.      [] PT being discontinued at this time. Patient independent. No further needs. [] PT being discontinued at this time as the patient has been transferred to palliative care. No further needs.     Garcia Galvan, PT

## 2020-05-13 NOTE — H&P
MIP images are provided for review. Stenosis of the internal carotid arteries measured using NASCET criteria. Dose modulation, iterative reconstruction, and/or weight based adjustment of the mA/kV was utilized to reduce the radiation dose to as low as reasonably achievable. COMPARISON: CT head scan without contrast May 11, 2020. HISTORY: ORDERING SYSTEM PROVIDED HISTORY: cva TECHNOLOGIST PROVIDED HISTORY: cva Reason for Exam: stroke alert Acuity: Unknown Type of Exam: Unknown FINDINGS: CTA NECK: AORTIC ARCH/ARCH VESSELS: No dissection or arterial injury. No significant stenosis of the brachiocephalic or subclavian arteries. CAROTID ARTERIES: No dissection, arterial injury, or hemodynamically significant stenosis by NASCET criteria. VERTEBRAL ARTERIES: No dissection, arterial injury, or significant stenosis. SOFT TISSUES: The lung apices are clear. No cervical or superior mediastinal lymphadenopathy. The larynx and pharynx are unremarkable. No acute abnormality of the salivary and thyroid glands. BONES: No acute osseous abnormality. CTA HEAD: ANTERIOR CIRCULATION: No significant stenosis of the intracranial internal carotid, anterior cerebral, or middle cerebral arteries. No aneurysm. POSTERIOR CIRCULATION: No significant stenosis of the vertebral, basilar, or posterior cerebral arteries. No aneurysm. OTHER: No dural venous sinus thrombosis on this non-dedicated study. BRAIN: No mass effect or midline shift. No extra-axial fluid collection. The gray-white differentiation is maintained. Asymmetric hypoattenuation is again noted within the left temporoparietal lobe suggestive of presence of acute ischemia. Unremarkable CTA of the head and neck. Redemonstration of asymmetric hypoattenuation involving the left temporoparietal lobe suggesting presence of acute ischemia.      ASSESSMENT  and  PLAN     Principal Problem:    Acute cerebrovascular accident (CVA) ([address]  Active Problems:    Mixed hyperlipidemia Broca's aphasia  Resolved Problems:    * No resolved hospital problems. *    Plan:  Ms. Polina Rosario is a healthy 52-year-old female who presented to the ED on 5/10/2020 for evaluation of speech difficulty. She was found to have expressive aphasia in the ED and had a CT head which showed likely ischemia. - Patient was outside of TPA window upon presentation  - Neurology consult, will appreciate their recommendations  - Given Aspirin 324 in ED, daily 81 mg starting this morning  - Nightly Atorvastatin 80 mg    Hypokalemia  - K of 3.3 on BMP, replace per protocol    DVT PPx- Lovenox  Diet- General  SLP, OT and PT to evaluate and treat  Acute ischemic CVA of the left left temporoparietal area significant severe Broca's aphasia and ataxia carotid  No significant blockage out of TPA window will do aspirin Plavix and Lipitor neurology consult speech therapy and eval and treat occupational physical therapy hypokalemia replace    Consultations:     IP CONSULT TO NEUROLOGY  IP CONSULT TO PHYSICAL MEDICINE REHAB      [address]    Phone [phone]

## 2020-05-13 NOTE — PROGRESS NOTES
Physical Medicine & Rehabilitation  F/u Chart review note    5/13/2020 11:19 AM     CC: Ambulatory and ADL dysfunction due to CVA    Brief history  24-year-old female admitted with aphasia. Found to have hypoattenuation left frontotemporal lobe. Outside TPA window. Neurology- acute left parietal lobe infarct, additional small infarct right parietal lobe, expressive greater than receptive aphasia, continue aspirin and Plavix and statin     Noted episodes of agitation today, refusing telemetry. Rehabilitation:   PT:  Restrictions/Precautions: Fall Risk  Implants present? : (pt denies)   Transfers  Sit to Stand: Contact guard assistance  Stand to sit: Contact guard assistance  Stand Pivot Transfers: Moderate Assistance(LOB to the left)  Comment: pt left up in bedside chair w/ pull away alarm on for speech evaluation- nurse Catalina aware  Ambulation 1  Surface: level tile  Device: No Device  Assistance: Minimal assistance  Quality of Gait: narrow LILY  Gait Deviations: Increased LILY  Distance: 20ft  Comments: quick pace- R side-neglect              OT:  ADL  Feeding: Setup  Grooming: Setup  UE Bathing: Stand by assistance  LE Bathing: Minimal assistance  UE Dressing: Stand by assistance  LE Dressing: Minimal assistance  Toileting: Minimal assistance  Additional Comments: Full ADL. VC for pacing and use of seated tasks for fall prevention. Pt c P insight, recepetion and carryover. Pt noted to use item improperly requriring cuing and SBA/Min as well as A for balance c dynamic standing tasks. Balance  Sitting Balance: Stand by assistance(seated EOB prior to mobility, to assess RUE ROM/MMT, to address LB dressing activity)  Standing Balance:  Moderate assistance(CGA-Mod A (static-dynamic))   Standing Balance  Time: 1min x5  Activity: ADLs/mobility  Comment: unsteady, impulsive, R neglect  Functional Mobility  Functional - Mobility Device: No device  Activity: Other(from EOB<>hospital doorway)  Assist Level: Moderate assistance  Functional Mobility Comments: quick/impulsive movements, VC for safety, VERY unsteady on feet requiring consistent Mod A for balance; Physical A to veer from environmental obstacles on r side     Bed mobility  Rolling to Right: Stand by assistance  Supine to Sit: Stand by assistance  Sit to Supine: Stand by assistance  Scooting: Stand by assistance  Comment: head of bed elevated slightly  Transfers  Sit to stand: Contact guard assistance  Stand to sit: Contact guard assistance  Transfer Comments: from EOB/toilet/bedside chair; VC for slow/controlled movements, can be quick moving/impulsive    Toilet Transfers  Toilet - Technique: Ambulating  Equipment Used: Grab bars  Toilet Transfer: Minimal assistance  Toilet Transfers Comments: steady A, gestural cuing for hand placement        ST:    Auditory Comprehension: Pt following direction of therapy tasks, answering yes/no appropriately     Identify picture from related verb: 100% accuracy     Verbal Expression: Pt completed confrontation naming task (using picture cards) with 60% accuracy lacho. Pt improving with mod verbal cues provided. Pt benefits from use of carrier phrase and phonemic cueing to improve word finding. Continued phonemic paraphasias noted.      Pt repeating simple SVO sentence with max verbal priming provided 40% accuracy lacho, no increase with max cues provided. Pt encouraged to utilize external pacing strategy (tapping with right hand).      Reading Comprehension: Pt reading words corresponding to target image with 100% accuracy         Speech: Apraxic throughout, pt perseverating on previous items but self correcting, visibly frustrated and aware of deficits     Other: Pt engaged in therapy and highly motivated             Objective:  BP (!) 154/97   Pulse 95   Temp 98.6 °F (37 °C) (Axillary)   Resp 16   Ht 5' 6\" (1.676 m)   Wt 172 lb 13.5 oz (78.4 kg)   SpO2 96%   BMI 27.90 kg/m²  I Body mass index is 27.9 kg/m².  I   Wt is a 78 y.o.  female with a history of Acute cerebrovascular accident (CVA) ([facility] [address]     1. Left parietal temporal lobe CVA aspirin, Lipitor Plavix  2. Aphasia  3. Episodes of agitation today, refusing telemetry?-Received dose of IV Ativan today     Recommendations:  1. Diagnosis: CVA  2. Therapy: PT/OT and speech needs  3. Medical  Necessity: Monitor for CVA extension,, DVT, falls, etc.  4. Support: Clarify, will need 24/7 due to cognitive deficits-aphasia  5. Rehab recommendation: Would benefit from acute inpatient rehabilitation when medically/neurologically ready if has 24/7 care on eventual discharge and agitation improves and patient participates. 6. DVT proph: Neeta Jay MD       This note is created with the assistance of a speech recognition program.  While intending to generate a document that actually reflects the content of the visit, the document can still have some errors including those of syntax and sound a like substitutions which may escape proof reading.   In such instances, actual meaning can be extrapolated by contextual diversion

## 2020-05-13 NOTE — PROGRESS NOTES
study with no suggestion of intra-atrial shunt. Impression and Plan: Ms. Nain Jimenez is a 78 y.o. female with   Acute left parietal lobe ischemic infarct; additional small infarct in right parietal lobe  Four day hx of aphasia; exam significant for expressive > receptive aphasia secondary to above  Images of brain MRI are reviewed with patient and her son at bedside. Loaded with aspirin and continued on aspirin 81 mg daily, Plavix 75 mg daily, atorvastatin 80 mg nightly. Permissive hypertension with target systolic not lower than [phone]. Cont PT/ OT/ speech therapy   passed bedside swallow and presently on regular diet. Care plan and also prognosis of aphasia is discussed with patient and her son at bedside. Patient's son's questions were answered. He voiced understanding those instructions. Will follow with you.             Kita Nguyen MD 5/13/2020 7:12 PM

## 2020-05-14 ENCOUNTER — HOSPITAL ENCOUNTER (INPATIENT)
Age: 80
LOS: 12 days | Discharge: HOME HEALTH | DRG: 057 | End: 2020-05-26
Attending: PHYSICAL MEDICINE & REHABILITATION | Admitting: PHYSICAL MEDICINE & REHABILITATION
Payer: MEDICARE

## 2020-05-14 VITALS
SYSTOLIC BLOOD PRESSURE: 135 MMHG | DIASTOLIC BLOOD PRESSURE: 83 MMHG | WEIGHT: 171.96 LBS | TEMPERATURE: 98.6 F | HEART RATE: 94 BPM | OXYGEN SATURATION: 95 % | RESPIRATION RATE: 16 BRPM | BODY MASS INDEX: 27.64 KG/M2 | HEIGHT: 66 IN

## 2020-05-14 PROBLEM — I63.9 ACUTE CVA (CEREBROVASCULAR ACCIDENT) (HCC): Status: ACTIVE | Noted: 2020-05-14

## 2020-05-14 LAB
ANION GAP SERPL CALCULATED.3IONS-SCNC: 15 MMOL/L (ref 9–17)
ANION GAP SERPL CALCULATED.3IONS-SCNC: 16 MMOL/L (ref 9–17)
BUN BLDV-MCNC: 37 MG/DL (ref 8–23)
BUN BLDV-MCNC: 41 MG/DL (ref 8–23)
BUN/CREAT BLD: ABNORMAL (ref 9–20)
BUN/CREAT BLD: ABNORMAL (ref 9–20)
CALCIUM SERPL-MCNC: 9.4 MG/DL (ref 8.6–10.4)
CALCIUM SERPL-MCNC: 9.7 MG/DL (ref 8.6–10.4)
CHLORIDE BLD-SCNC: 101 MMOL/L (ref 98–107)
CHLORIDE BLD-SCNC: 104 MMOL/L (ref 98–107)
CO2: 20 MMOL/L (ref 20–31)
CO2: 22 MMOL/L (ref 20–31)
CREAT SERPL-MCNC: 1.06 MG/DL (ref 0.5–0.9)
CREAT SERPL-MCNC: 1.12 MG/DL (ref 0.5–0.9)
GFR AFRICAN AMERICAN: 56 ML/MIN
GFR AFRICAN AMERICAN: >60 ML/MIN
GFR NON-AFRICAN AMERICAN: 47 ML/MIN
GFR NON-AFRICAN AMERICAN: 50 ML/MIN
GFR SERPL CREATININE-BSD FRML MDRD: ABNORMAL ML/MIN/{1.73_M2}
GLUCOSE BLD-MCNC: 109 MG/DL (ref 70–99)
GLUCOSE BLD-MCNC: 116 MG/DL (ref 70–99)
HCT VFR BLD CALC: 45.1 % (ref 36–46)
HEMOGLOBIN: 14.8 G/DL (ref 12–16)
MCH RBC QN AUTO: 30.1 PG (ref 26–34)
MCHC RBC AUTO-ENTMCNC: 32.9 G/DL (ref 31–37)
MCV RBC AUTO: 91.4 FL (ref 80–100)
NRBC AUTOMATED: NORMAL PER 100 WBC
PDW BLD-RTO: 13.4 % (ref 11.5–14.9)
PLATELET # BLD: 325 K/UL (ref 150–450)
PMV BLD AUTO: 7.5 FL (ref 6–12)
POTASSIUM SERPL-SCNC: 3.5 MMOL/L (ref 3.7–5.3)
POTASSIUM SERPL-SCNC: 3.5 MMOL/L (ref 3.7–5.3)
RBC # BLD: 4.93 M/UL (ref 4–5.2)
SODIUM BLD-SCNC: 137 MMOL/L (ref 135–144)
SODIUM BLD-SCNC: 141 MMOL/L (ref 135–144)
WBC # BLD: 6.5 K/UL (ref 3.5–11)

## 2020-05-14 PROCEDURE — 92507 TX SP LANG VOICE COMM INDIV: CPT

## 2020-05-14 PROCEDURE — 2580000003 HC RX 258: Performed by: PHYSICIAN ASSISTANT

## 2020-05-14 PROCEDURE — 6370000000 HC RX 637 (ALT 250 FOR IP): Performed by: INTERNAL MEDICINE

## 2020-05-14 PROCEDURE — 6370000000 HC RX 637 (ALT 250 FOR IP): Performed by: PHYSICIAN ASSISTANT

## 2020-05-14 PROCEDURE — 1180000000 HC REHAB R&B

## 2020-05-14 PROCEDURE — 99232 SBSQ HOSP IP/OBS MODERATE 35: CPT | Performed by: PSYCHIATRY & NEUROLOGY

## 2020-05-14 PROCEDURE — 36415 COLL VENOUS BLD VENIPUNCTURE: CPT

## 2020-05-14 PROCEDURE — 97116 GAIT TRAINING THERAPY: CPT

## 2020-05-14 PROCEDURE — 6360000002 HC RX W HCPCS: Performed by: PHYSICIAN ASSISTANT

## 2020-05-14 PROCEDURE — 80048 BASIC METABOLIC PNL TOTAL CA: CPT

## 2020-05-14 PROCEDURE — 85027 COMPLETE CBC AUTOMATED: CPT

## 2020-05-14 PROCEDURE — 99232 SBSQ HOSP IP/OBS MODERATE 35: CPT | Performed by: INTERNAL MEDICINE

## 2020-05-14 RX ORDER — ASPIRIN 81 MG/1
81 TABLET ORAL DAILY
Status: CANCELLED | OUTPATIENT
Start: 2020-05-14

## 2020-05-14 RX ORDER — ATORVASTATIN CALCIUM 80 MG/1
80 TABLET, FILM COATED ORAL NIGHTLY
Status: DISCONTINUED | OUTPATIENT
Start: 2020-05-14 | End: 2020-05-26 | Stop reason: HOSPADM

## 2020-05-14 RX ORDER — POLYETHYLENE GLYCOL 3350 17 G/17G
17 POWDER, FOR SOLUTION ORAL DAILY PRN
Status: DISCONTINUED | OUTPATIENT
Start: 2020-05-14 | End: 2020-05-16

## 2020-05-14 RX ORDER — POLYETHYLENE GLYCOL 3350 17 G/17G
17 POWDER, FOR SOLUTION ORAL DAILY
Status: DISCONTINUED | OUTPATIENT
Start: 2020-05-14 | End: 2020-05-26 | Stop reason: HOSPADM

## 2020-05-14 RX ORDER — CLOPIDOGREL BISULFATE 75 MG/1
75 TABLET ORAL DAILY
Status: DISCONTINUED | OUTPATIENT
Start: 2020-05-15 | End: 2020-05-15

## 2020-05-14 RX ORDER — ASPIRIN 81 MG/1
81 TABLET ORAL DAILY
Status: DISCONTINUED | OUTPATIENT
Start: 2020-05-14 | End: 2020-05-26 | Stop reason: HOSPADM

## 2020-05-14 RX ADMIN — Medication 10 ML: at 08:44

## 2020-05-14 RX ADMIN — ENOXAPARIN SODIUM 40 MG: 40 INJECTION SUBCUTANEOUS at 08:44

## 2020-05-14 RX ADMIN — CLOPIDOGREL BISULFATE 75 MG: 75 TABLET ORAL at 08:44

## 2020-05-14 RX ADMIN — ATORVASTATIN CALCIUM 80 MG: 80 TABLET, FILM COATED ORAL at 20:48

## 2020-05-14 RX ADMIN — ASPIRIN 81 MG: 81 TABLET, COATED ORAL at 08:44

## 2020-05-14 ASSESSMENT — PAIN SCALES - GENERAL
PAINLEVEL_OUTOF10: 0
PAINLEVEL_OUTOF10: 0

## 2020-05-14 NOTE — PROGRESS NOTES
with LOB >3 times able to self correct with MIN A. Pt may benefit from using RW, RW left in room. Stairs/Curb  Stairs?: No     Balance  Sitting - Static: Good;-  Sitting - Dynamic: Fair;-  Standing - Static: Fair;+(no device)  Standing - Dynamic: Poor(no device)  Comments: Seated EOB, Standing with no AD- pt very unsteady. R side neglect. Other exercises  Other exercises 1: Seated EOB B LE ex's x10     Goals  Short term goals  Time Frame for Short term goals: 1-2 times/ day for 5 days  Short term goal 1: pt to tolerate 30  - 45 minutes  of therapuetic exercise and activity per treatment  Short term goal 2: pt to demonstrate improved strength bilateral LEs by 1/2 MMG to assist w/ balance and gait  Short term goal 3: pt to demonstrate good technique for LE strengthening, ROM, balance and coordination activities.   Short term goal 4: pt to demonstrate independent bed mobility for rolling and supine <> sit for position change  Short term goal 5: pt to demonstrate transfers sit <> stand and bed <> chair w/ CGA x 1 using least restrictive device  Short term goal 6: pt to demonstrate gait 50-80' w/ CGA x 1 using least restrictive device  Short term goal 7: pt to demonstrate fair + or better dynamic ambulatory balance  using least restrictive device  Short term goal 8: pt to demonstrate ability to ascend/ descend 4-6\" platform step   using least restrictive device w/ min x 1  Patient Goals   Patient goals : get better    Plan    Plan  Times per week: 1-2 times/ day for 5 days  Safety Devices  Type of devices: Call light within reach, Bed alarm in place, Patient at risk for falls, Telesitter in use, Left in bed, Gait belt, All fall risk precautions in place, Nurse notified(SARAH LOUISE )     Therapy Time         05/14/20 1114   PT Individual Minutes   Time In 1040   Time Out 1052   Minutes [address] [place], [place]

## 2020-05-14 NOTE — PROGRESS NOTES
[place] [address] Internal Medicine    Progress Note  Family / Caregiver Present: No  Referring Practitioner: Dr. Tami Trivedi  Comments: Chata alvarez's tx  5/14/2020    12:14 PM    Name:   Giuseppe Matthews  MRN:     [id number]     Acct:      [de-identified]   Room:   [facility]   Day:  3  Admit Date:  5/10/2020 11:59 PM    PCP:   No primary care provider on file. Code Status:  Full Code    Subjective:     C/C:   Chief Complaint   Patient presents with    Aphasia     Principal Problem:    Acute cerebrovascular accident (CVA) ([address]  Active Problems:    Mixed hyperlipidemia    Broca's aphasia  Resolved Problems:    * No resolved hospital problems. *      Interval History Status: not changed.             Significant last 24 hr data reviewed ;   Vitals:    05/13/20 1913 05/13/20 2346 05/14/20 0645 05/14/20 0955   BP: (!) 147/77 115/70  (!) 140/86   Pulse: 92 98  105   Resp: 18 16  16   Temp: 97.5 °F (36.4 °C) 97.9 °F (36.6 °C)  97.3 °F (36.3 °C)   TempSrc: Oral Oral  Oral   SpO2: 95% 94%  96%   Weight:   171 lb 15.3 oz (78 kg)    Height:          Recent Results (from the past 24 hour(s))   CBC    Collection Time: 05/14/20  5:27 AM   Result Value Ref Range    WBC 6.5 3.5 - 11.0 k/uL    RBC 4.93 4.0 - 5.2 m/uL    Hemoglobin 14.8 12.0 - 16.0 g/dL    Hematocrit 45.1 36 - 46 %    MCV 91.4 80 - 100 fL    MCH 30.1 26 - 34 pg    MCHC 32.9 31 - 37 g/dL    RDW 13.4 11.5 - 14.9 %    Platelets 890 760 - 119 k/uL    MPV 7.5 6.0 - 12.0 fL    NRBC Automated NOT REPORTED per 100 WBC   Basic Metabolic Prof    Collection Time: 05/14/20  5:27 AM   Result Value Ref Range    Glucose 116 (H) 70 - 99 mg/dL    BUN 37 (H) 8 - 23 mg/dL    CREATININE 1.06 (H) 0.50 - 0.90 mg/dL    Bun/Cre Ratio NOT REPORTED 9 - 20    Calcium 9.4 8.6 - 10.4 mg/dL    Sodium 141 135 - 144 mmol/L    Potassium 3.5 (L) 3.7 - 5.3 mmol/L    Chloride 104 98 - 107 mmol/L    CO2 22 20 - 31 mmol/L    Anion Gap 15 9 - 17 mmol/L    GFR BUN 22 29* 37*   CREATININE 0.96* 1.08* 1.06*   ANIONGAP 14 14 15   LABGLOM 56* 49* 50*   GFRAA >60 59* >60   CALCIUM 9.3 9.6 9.4     Recent Labs     05/12/20  0531      CHOL 237*      LDLCHOLESTEROL 121   CHOLHDLRATIO 2.4   TRIG 82   VLDL NOT REPORTED     Glucose:  Recent Labs     05/12/20  0531   LABA1C 5.2         No results found for: SPECIAL  No results found for: CULTURE    No results found for: POCPH, PHART, PH, POCPCO2, ZHJ7VMF, PCO2, POCPO2, PO2ART, PO2, POCHCO3, PHV1EKP, HCO3, NBEA, PBEA, BEART, BE, THGBART, THB, XBY1RNF, AOXT0HYN, N9QAORRH, O2SAT, FIO2    Radiology:        Physical Examination:        General appearance:  alert, cooperative and no distress  Mental Status:  oriented to person, place and time and normal affect  Lungs:  clear to auscultation bilaterally, normal effort  Heart:  regular rate and rhythm, no murmur  Abdomen:  soft, nontender, nondistended, normal bowel sounds, no masses, hepatomegaly, splenomegaly  Extremities:  no edema, redness, tenderness in the calves  Skin:  no gross lesions, rashes, induration  Neurological positive for Broca's aphasia  Assessment:        Primary Problem  Acute cerebrovascular accident (CVA) [facility])    Active Hospital Problems    Diagnosis Date Noted    Acute cerebrovascular accident (CVA) ([facility] [address] [I63.9] 05/11/2020    Mixed hyperlipidemia [E78.2] 05/11/2020    Broca's aphasia [R47.01] 05/11/2020       Plan:        Ms. Donna Fenton is a healthy 78-year-old female who presented to the ED on 5/10/2020 for evaluation of speech difficulty. She was found to have expressive aphasia in the ED and had a CT head which showed likely ischemia.   - Patient was outside of TPA window upon presentation  - Neurology consult, will appreciate their recommendations  - Given Aspirin 324 in ED, daily 81 mg starting this morning  - Nightly Atorvastatin 80 mg     Hypokalemia  - K of 3.3 on BMP, replace per protocol     DVT PPx- Lovenox  Diet- General  SLP, OT and PT to

## 2020-05-14 NOTE — CARE COORDINATION
ONGOING DISCHARGE PLAN:    Spoke with patient regarding discharge plan and patient confirms that plan is to go to ARU today, However there are no beds available. LSW following for [facility] choice, which is the rehab hospital [facility]. Pt. Is + CVA w/ Expressive Aphasia. Will continue to follow for additional discharge needs.     Electronically signed by Narda Mustafa RN on 5/14/2020 at 11:58 AM

## 2020-05-14 NOTE — PROGRESS NOTES
Speech Language Pathology  Speech Language Pathology  [facility]    Speech Language Treatment Note    Date: 5/14/2020  Patients Name: Jose Davis  MRN: [id number]  Diagnosis: aphasia   Patient Active Problem List   Diagnosis Code    Acute cerebrovascular accident (CVA) ([facility] [address] I63.9    Mixed hyperlipidemia E78.2    Broca's aphasia R47.01       Pain: denies    Speech and Language Treatment  Treatment time: 0482-4817  Subjective: [x] Alert [x] Cooperative     [] Confused     [] Agitated    [] Lethargic      Objective/Assessment:  Auditory Comprehension:  Following auditory directions, Body part ID 90% simple directions. Yes/no questions: 80% accuracy, multiple repetitions. Verbal Expression:  Pt stating first and last name verbally upon arrival (improved from previous session). Pt eager and motivated to participate in therapy session. confrontation naming task with 80% accuracy lacho (words are close approximations of target, phonemic paraphasias) Pt improving with mod verbal cues provided. Pt continues to benefit from use of carrier phrase and phonemic cueing to improve word finding. Response elaboration training: Pt continues with telegraphic speech with phonemic paraphasias in picture description. Response elaboration provided. Pt repeating target SVO sentence in picture description tasks with 50% accuracy max cues. Pt encouraged to utilize pacing strategies discussed in previous sessions. Reading Comprehension: did not treat    Speech: Apraxic throughout, pt perseverating on previous items but self correcting independently    Other:   Pt attempting to write family names upon arrival. Pt encouraged to write her own name instead as this is a more functional task for patient. Pt verbalized understanding. Pt unable to write own name (first and last). Pt is writing first name independently. Pt encouraged to practice independently.  AAC (communication board) remains on table, however, pt did not utilize at all during session during communication break downs. Plan:  [x] Continue ST services    [] Discharge from ST:      Discharge recommendations: [] Inpatient Rehab   [] [place]   [] Outpatient Therapy  [] Follow up at trauma clinic   [] Other:       Treatment completed by:  Mei Gonsalez M.A., [place]

## 2020-05-14 NOTE — PROGRESS NOTES
Spoke with JERZY Santizo [address], to request clarification re:  pt's support on d/c.      Spoke with Terry Fu who states pt's son works from home and is available 24/7. Terry Fu also states pt is medically ready for ARU. Pt will need d/c readmit completed, DVT prophy continued, and report called to [phone]. Admission Assessment completed and Dr Jarad An notified.

## 2020-05-14 NOTE — PROGRESS NOTES
Non- 50 (L) >60 mL/min    GFR African American >60 >60 mL/min    GFR Comment          GFR Staging NOT REPORTED      No results for input(s): POCGLU in the last 72 hours. No results found. HPI:   See history in H and P      Review of Systems:     Constitutional:  negative for chills, fevers, sweats  Respiratory:  negative for cough, dyspnea on exertion, hemoptysis, shortness of breath, wheezing  Cardiovascular:  negative for chest pain, chest pressure/discomfort, lower extremity edema, palpitations  Gastrointestinal:  negative for abdominal pain, constipation, diarrhea, nausea, vomiting  Neurological: Positive for Broca's aphasia    Medications: Allergies:  No Known Allergies    Current Meds:   Scheduled Meds:    sodium chloride flush  10 mL Intravenous 2 times per day    enoxaparin  40 mg Subcutaneous Daily    aspirin  81 mg Oral Daily    Or    aspirin  300 mg Rectal Daily    atorvastatin  80 mg Oral Nightly    clopidogrel  75 mg Oral Daily     Continuous Infusions:   PRN Meds: sodium chloride flush, polyethylene glycol, promethazine **OR** ondansetron, potassium chloride **OR** potassium alternative oral replacement **OR** potassium chloride    Data:     Past Medical History:  no change     Social History:  no change    Family History: @no change    Vitals:      I/O (24Hr):     Intake/Output Summary (Last 24 hours) at 5/14/2020 1216  Last data filed at 5/14/2020 0503  Gross per 24 hour   Intake 250 ml   Output --   Net 250 ml       Labs:    Hematology:  Recent Labs     05/12/20 0531 05/13/20 0552 05/14/20  0527   WBC 4.4 6.6 6.5   RBC 4.79 4.99 4.93   HGB 14.4 14.9 14.8   HCT 43.4 45.2 45.1   MCV 90.7 90.7 91.4   MCH 30.2 29.9 30.1   MCHC 33.3 33.0 32.9   RDW 13.3 13.6 13.4    305 325   MPV 7.3 7.5 7.5     Chemistry:  Recent Labs     05/12/20 0531 05/13/20  0552 05/14/20  0527    141 141   K 3.8 3.6* 3.5*    104 104   CO2 23 23 22   GLUCOSE 112* 121* 116* evaluate and treat  Acute ischemic CVA of the left left temporoparietal area significant severe Broca's aphasia and ataxia carotid  No significant blockage out of TPA window will do aspirin Plavix and Lipitor neurology consult speech therapy and eval and treat occupational physical therapy hypokalemia replace  Dc plan to acute rehab        Arlen Durbin MD  5/14/2020  12:16 PM

## 2020-05-15 LAB
HCT VFR BLD CALC: 43.8 % (ref 36–46)
HEMOGLOBIN: 14.3 G/DL (ref 12–16)
MCH RBC QN AUTO: 30 PG (ref 26–34)
MCHC RBC AUTO-ENTMCNC: 32.7 G/DL (ref 31–37)
MCV RBC AUTO: 91.6 FL (ref 80–100)
NRBC AUTOMATED: NORMAL PER 100 WBC
PDW BLD-RTO: 13.4 % (ref 11.5–14.9)
PLATELET # BLD: 314 K/UL (ref 150–450)
PMV BLD AUTO: 8.2 FL (ref 6–12)
RBC # BLD: 4.78 M/UL (ref 4–5.2)
WBC # BLD: 8.9 K/UL (ref 3.5–11)

## 2020-05-15 PROCEDURE — 6370000000 HC RX 637 (ALT 250 FOR IP): Performed by: INTERNAL MEDICINE

## 2020-05-15 PROCEDURE — 92523 SPEECH SOUND LANG COMPREHEN: CPT

## 2020-05-15 PROCEDURE — 97110 THERAPEUTIC EXERCISES: CPT

## 2020-05-15 PROCEDURE — 99222 1ST HOSP IP/OBS MODERATE 55: CPT | Performed by: INTERNAL MEDICINE

## 2020-05-15 PROCEDURE — 6360000002 HC RX W HCPCS: Performed by: INTERNAL MEDICINE

## 2020-05-15 PROCEDURE — 97530 THERAPEUTIC ACTIVITIES: CPT

## 2020-05-15 PROCEDURE — 99222 1ST HOSP IP/OBS MODERATE 55: CPT | Performed by: PHYSICAL MEDICINE & REHABILITATION

## 2020-05-15 PROCEDURE — 1180000000 HC REHAB R&B

## 2020-05-15 PROCEDURE — 36415 COLL VENOUS BLD VENIPUNCTURE: CPT

## 2020-05-15 PROCEDURE — 97166 OT EVAL MOD COMPLEX 45 MIN: CPT

## 2020-05-15 PROCEDURE — 97116 GAIT TRAINING THERAPY: CPT

## 2020-05-15 PROCEDURE — 92507 TX SP LANG VOICE COMM INDIV: CPT

## 2020-05-15 PROCEDURE — 85027 COMPLETE CBC AUTOMATED: CPT

## 2020-05-15 PROCEDURE — 97535 SELF CARE MNGMENT TRAINING: CPT

## 2020-05-15 PROCEDURE — 97162 PT EVAL MOD COMPLEX 30 MIN: CPT

## 2020-05-15 RX ORDER — CLOPIDOGREL BISULFATE 75 MG/1
75 TABLET ORAL DAILY
Status: DISCONTINUED | OUTPATIENT
Start: 2020-05-16 | End: 2020-05-26 | Stop reason: HOSPADM

## 2020-05-15 RX ADMIN — CLOPIDOGREL BISULFATE 75 MG: 75 TABLET ORAL at 08:25

## 2020-05-15 RX ADMIN — ENOXAPARIN SODIUM 40 MG: 40 INJECTION SUBCUTANEOUS at 08:25

## 2020-05-15 RX ADMIN — ASPIRIN 81 MG: 81 TABLET, COATED ORAL at 08:25

## 2020-05-15 RX ADMIN — ATORVASTATIN CALCIUM 80 MG: 80 TABLET, FILM COATED ORAL at 21:05

## 2020-05-15 ASSESSMENT — 9 HOLE PEG TEST
TEST_RESULT: IMPAIRED
TESTTIME_SECONDS: 41
TESTTIME_SECONDS: 37
TEST_RESULT: IMPAIRED

## 2020-05-15 ASSESSMENT — PAIN SCALES - GENERAL: PAINLEVEL_OUTOF10: 0

## 2020-05-15 NOTE — H&P
input(s): BNP in the last 72 hours. PT/INR: No results for input(s): PROTIME, INR in the last 72 hours. APTT: No results for input(s): APTT in the last 72 hours. CARDIAC ENZYMES: No results for input(s): CKMB, CKMBINDEX, TROPONINT in the last 72 hours. Invalid input(s): CKTOTAL;3  FASTING LIPID PANEL:  Lab Results   Component Value Date    CHOL 237 (H) 05/12/2020     05/12/2020    TRIG 82 05/12/2020     LIVER PROFILE: No results for input(s): AST, ALT, ALB, BILIDIR, BILITOT, ALKPHOS in the last 72 hours. Social History:  Dwelling House - 2 story. Steps to enter:  5 and 5 to second floor,  Bed/bathroom level:  same. Lives with: Son  Devices: Walker  Activity level:  normal activities of daily living  Social History     Socioeconomic History    Marital status:       Spouse name: Not on file    Number of children: Not on file    Years of education: Not on file    Highest education level: Not on file   Occupational History    Not on file   Social Needs    Financial resource strain: Not on file    Food insecurity     Worry: Not on file     Inability: Not on file    Transportation needs     Medical: Not on file     Non-medical: Not on file   Tobacco Use    Smoking status: Never Smoker    Smokeless tobacco: Never Used   Substance and Sexual Activity    Alcohol use: Not Currently    Drug use: Never    Sexual activity: Not on file   Lifestyle    Physical activity     Days per week: Not on file     Minutes per session: Not on file    Stress: Not on file   Relationships    Social connections     Talks on phone: Not on file     Gets together: Not on file     Attends Islam service: Not on file     Active member of club or organization: Not on file     Attends meetings of clubs or organizations: Not on file     Relationship status: Not on file    Intimate partner violence     Fear of current or ex partner: Not on file     Emotionally abused: Not on file     Physically abused: Not on Aphasia  [] Transcortical Motor Aphasia  [] Transcortical Sensory Aphasia  [] Anomic Aphasia       Principal Diagnosis/plan:  The patient is a 78y.o. year old with expressive aphasia secondary to ischemic stroke    She will require close medical monitoring for the comorbidities listed below. She will benefit from intensive interdisciplinary therapies and rehab nursing care and is appropriate for inpatient rehabilitation. The post admission physician evaluation (ERICH) is consistent with the pre-admission assessment. See above findings to reflect the elements required in the ERICH. Patient's admitting condition is consistent with the findings of the preadmission assessment by the rehabilitation admissions coordinator. Diagnoses/plan:    1. Expressive aphasia:  Speech/PT/OT for gait, mobility, strengthening, endurance, ADLs, and self care. She is currently on dual antiplatelets for 21 days (until 6/1/2020) and then will continue aspirin as monotherapy. Patient also on atorvastatin 80 mg p.o. nightly  2. Mild MAGED: Increased creatinine from baseline of 0.8. Will be evaluated in the morning with another BMP and internal medicine consulted for management  3. DVT Prophylaxis:  Enoxaparin  4. IM for medical management      Estimated Length of Stay:  1 weeks. Prognosis  good    Goals    Home at [facility] at Discharge: [facility]  Adult General Neurology Resident PGY-3  5/15/2020 at 9:10 AM    This note is created with the assistance of a speech recognition program.  While intending to generate a document that actually reflects the content of the visit, the document can still have some errors including those of syntax and sound a like substitutions which may escape proof reading. In such instances, actual meaning can be extrapolated by contextual diversion.

## 2020-05-15 NOTE — PROGRESS NOTES
Assistance     Current functional status for social interaction: Close supervision     Current functional status for problem solving: Minimal contact assistance     Current functional status for memory: TBD     Current Deficits R/T Impairment: Impaired Functional Mobility and Decreased ADLs     Required Therapy:   [x]? Physical Therapy  [x]? Occupational Therapy   [x]? Speech Therapy     Additional Services:  [x]?   [x]? Recreational Therapy  [x]? Nutrition  []? Dialysis  []? Other:      Rehab Justification:  Needs 3 hrs therapy per day or 15 hours per week:  Yes  Identified Rehab Nursing needs: Yes  Intense Interdisciplinary need:  Yes  Need for 24 hr physician supervision:  Yes  Measurable improved quality of life:  Yes  Willingness to participate:  Yes  Medical Necessity:  Yes  Patient able to tolerate care proposed: Yes     Expected Discharge Destination/Functional Level:  Home with assist  Expected length of time to achieve that level of improvement: 1-2 weeks  Expected Post Discharge Treatments: Home with possible Home Care     Other information relevant to the care needs:  n/a     Acute Inpatient Rehabilitation Disclosure Statement provided to patient. Patient verbalized understanding.   Copy placed on patient's light chart.     I have reviewed and concur with the findings and results of the pre-admission screening assessment completed by the Inpatient Rehabilitation Admissions Coordinator.                  Cosigned by: Eddie Edwards MD at 5/14/2020  3:03 PM

## 2020-05-15 NOTE — PROGRESS NOTES
[address]   Acute Rehabilitation OT Evaluation  Date: 5/15/20  Patient Name: Isac Santiago       Room: 4036/6246-47  MRN: [id number]  Account: [de-identified]   : 1940  (78 y.o.) Gender: female     Referring Practitioner: Kellen Nunez MD  Diagnosis: Acute CVA   Additional Pertinent Hx: 39-year-old female admitted with difficulty speaking. .  CT noted asymmetric hypoattenuation left parietal temporal lobe likely representing acute ischemia otherwise unremarkable. Outside TPA window. CTA head and neck completed unremarkable. Pt admitted to [address] [place] [address] 20. MRI Brain 20: Acute infarct within the left parietal lobe with an additional acute  microinfarct within the contralateral parietal lobe. Treatment Diagnosis: impaired self care status    Past Medical History:  has a past medical history of Arthritis. Past Surgical History:   has no past surgical history on file. Restrictions  Restrictions/Precautions: [employer]; telesitter)  Implants present? : (pt denies)  Other position/activity restrictions: up as tolerated  Required Braces or Orthoses?: No      Subjective  Subjective: \"Gonzales!!\" - AM session pt trying hard to state sons name, able to state when unprompted on way to bathroom  Comments: Patient agreeable to session. Frustrated with expressive aphasia. Telesitter, impulsive tendencies. Shortened session in afternoon due to 2nd speech session   Orientation  Orientation Level: Oriented to place, Oriented to person, Oriented to situation(able to state first/last name, unable to state  or current date due to aphasia, (goes through months- January, ary. ... skips May- unable to state May; able to select from multiple choice , current date, and place (hospital), \"stroke\"-situation)  Vision  Vision: Impaired  Vision Exceptions: Wears glasses for reading  Hearing  Hearing: Within functional limits  Vision - Basic Assessment  Patient Visual Report: No visual complaint reported. Vision Comments: denies double vision/blurry vision, reports vision unchanged since prior to CVA   Social/Functional History  Lives With: Son(son-Ronald)  Type of Home: House  Home Layout: Two level, Able to Live on Main level with bedroom/bathroom(bedroom and full bathroom on first floor)  Home Access: Stairs to enter without rails  Entrance Stairs - Number of Steps: 6  Entrance Stairs - Rails: None  Bathroom Shower/Tub: Tub/Shower unit, Curtain  Bathroom Toilet: Standard  Bathroom Equipment: Grab bars in shower  Bathroom Accessibility: Accessible  Home Equipment: Rolling walker  ADL Assistance: Independent  Homemaking Assistance: Independent  Homemaking Responsibilities: Yes(cooking, cleaning, laundry, grocery shopping)  Ambulation Assistance: Independent(no device)  Transfer Assistance: Independent  Active : Yes  Mode of Transportation: Car  Occupation: Retired  Type of occupation: Bank  Leisure & Hobbies: reading  IADL Comments: sleeps in regular bed  Additional Comments: pt lives with son- works M-F: pt alone during day        Objective  Vision - Basic Assessment  Patient Visual Report: No visual complaint reported.   Vision Comments: denies double vision/blurry vision, reports vision unchanged since prior to CVA    Cognition  Overall Cognitive Status: Exceptions  Arousal/Alertness: Appropriate responses to stimuli  Attention Span: Attends with cues to redirect  Memory: Appears intact  Safety Judgement: Decreased awareness of need for assistance, Decreased awareness of need for safety  Insights: Decreased awareness of deficits  Initiation: Requires cues for some  Sequencing: Requires cues for some  Cognition Comment: expressive aphasia, quick movements, can be impulsive    Sensation  Overall Sensation Status: WFL(pt denies numbness/tingling)     UE Function  Hand Dominance  Hand Dominance: Right        LUE Strength  Gross LUE Strength: WFL  L Hand General: 4/5  LUE Strength Comment: shoulder flexion 4+/5, eblow flexion 5/5  Left Hand Strength -  (lbs)  Handle Setting 1: 12# (difficulty with  due to Dupuytren's 2 digits) (norms 31-50#)  LUE Tone: Normotonic     LUE AROM (degrees)  LUE AROM : WFL     Left Hand AROM (degrees)  Left Hand AROM: Exceptions  Left Hand General AROM: Dupuytren's contracture present: 3rd and 4th digits (ring/pinky finger) reports has been this way for 20 years- fingers do not extend   RUE Strength  Gross RUE Strength: WFL  R Hand General: 4/5  RUE Strength Comment: shoulder flexion 4+/5, elbow flexion 5/5   Right Hand Strength -  (lbs)  Handle Setting 1: 12# (difficulty with  due to Dupuytren's 2 digits) (norms 31-50#)  RUE Tone: Normotonic     RUE AROM (degrees)  RUE AROM : WFL     Right Hand AROM (degrees)  Right Hand AROM: Exceptions  Right Hand General AROM: Dupuytren's contracture present: 1st and 3rd digit (index/ring finger)  reports has been this way for 20 years- fingers do not extend      12# (difficulty with  due to Dupuytren's 2 digits) (norms 31-50#)        Left 9-Hole Peg Test: Impaired  Left 9 Hole Peg Test Time (secs): 37  Right 9-Hole Peg Test: Impaired  Right 9 Hole Peg Test Time (secs): 41  Fine Motor Comment: during 9 HPT uses other hand occassionally to manuever peg. difficulty with FM due to Dupuytren's- reports has been managing for ~20 years, reports no strength/FM issues from CVA   Other Assessment: 9 HPT norms 22-31 sec          Fine Motor Skills  Coordination  Movements Are Fluid And Coordinated: Yes  Coordination and Movement description: (WFL finger-nose test; 9 Hole Peg test and Box n Blocks assessed further- see below for details)            Box and Blocks          Box&Blocks test performed:    RUE: 28 blocks   LUE: 26 blocks   Norm for 77 y/o F: RUE: 65  blocks   Norm for 77 y/o F: LUE: 63.6 blocks    Assessment: Pt demo difficulty due to Dupuytren's both hands, patient asked to count blocks after assessment, pt able to count to 1-19 then jumped around numbers for remainer of blocks                   Mobility  Supine to Sit: Supervision       Balance  Sitting Balance: Supervision(seated EOB unsupported)  Standing Balance: Minimal assistance(CGA-Min A)  Standing Balance  Time: ~2-3 minutes  Activity: AM: mobility to bathroom, standing at sink for hand hygiene  Comment: impulsive, CGA-Min A, VC for location of soap/paper towel (on R side of wall), decreased safety, 0-1 UE support   Functional Mobility  Functional - Mobility Device: (RW (AM) vs no device (PM))  Activity: Other, To/from bathroom  Assist Level: Minimal assistance(CGA (RW) -Min A (no device))  Functional Mobility Comments: quick/impulsive movements, VC for safety. assessed mobility to/from bathroom in AM with RW - CGA; assessed mobility from EOB > w/c PM w/o device- Min A   Bed mobility  Rolling to Left: Supervision  Rolling to Right: Supervision  Supine to Sit: Supervision  Scooting: Supervision  Comment: supervision for safety only, able to get to EOB w/o assistance, performed up to EOB AM and PM     Transfers  Sit to stand: Contact guard assistance  Stand to sit: Contact guard assistance  Transfer Comments: from EOB, VC for safe transfer with RW / no device       Activity Tolerance: Patient Tolerated treatment well         ADL     ADL  Feeding: Setup; Increased time to complete(set up provided for lunch tray)  LE Dressing: (able to don socks AM and PM)  Additional Comments: Patient refuses ADL tasks this date. Reports already performing grooming/bathing/dressing tasks however unable to report assistance needed due to aphasia. Will assess tomorrow.       OT scores     Eating  Assistance Needed: Setup or clean-up assistance  CARE Score: 5  Discharge Goal: Independent  Oral Hygiene  Reason if not Attempted: Patient refused  CARE Score: 7  Discharge Goal: [address]  Reason if not Attempted: Patient refused  CARE Score: 7  Discharge Goal: strength/coordination for functional tasks  Long term goal 5: V/D Good understanding of DME for increased safety during ADL routine for safe d/c home     Assessment  Performance deficits / Impairments: Decreased functional mobility , Decreased endurance, Decreased strength, Decreased ADL status, Decreased safe awareness, Decreased balance, Decreased coordination  Treatment Diagnosis: impaired self care status   Prognosis: Good  Decision Making: Medium Complexity  REQUIRES OT FOLLOW UP: Yes  Discharge Recommendations: Patient would benefit from continued therapy after discharge, Home with assist PRN  Plan  Times per week: 1.5 hr/day, 5-7x/wk   Times per day: Twice a day  Current Treatment Recommendations: Strengthening, Functional Mobility Training, Patient/Caregiver Education & Training, Endurance Training, Equipment Evaluation, Education, & procurement, Balance Training, Safety Education & Training, Self-Care / ADL, Home Management Training, Cognitive/Perceptual Training  OT Education  OT Education: OT Role, Plan of Care, Precautions, ADL Adaptive Strategies, Transfer Training, Energy Conservation, Orientation, Equipment, Family Education  Patient Education: safety with mobility, use of call light when getting up, ARU expectations        OT Individual Minutes  Time In: 1030  Time Out: 1130  Minutes: 60    Time in 1325  Time out 1335  Minutes 10    Electronically signed by Sherlyn Guzman OT on 5/15/20 at 2:59 PM EDT

## 2020-05-15 NOTE — CONSULTS
[place] [address] Internal Medicine    CONSULTATION / HISTORY AND PHYSICAL EXAMINATION            Date:   5/15/2020  Patient name:  Esthela Marc  Date of admission:  5/14/2020  4:36 PM  MRN:   [id number]  Account:  [de-identified]  YOB: 1940  PCP:    No primary care provider on file. Room:   [address] [place] [address]  Code Status:    Full Code    Physician Requesting Consult: Sari Ott MD    Reason for Consult:  Medical management,     Chief Complaint:     No chief complaint on file. Acute ischemic stroke    History Obtained From:     Patient, EMR, nursing staff    History of Present Illness:     Initially admitted on 5/10 for expressive aphasia, noted to have acute ischemic stroke, no TPA given. Started on aspirin, Plavix Lipitor  Admitted to ER you on 5/14 for strengthening exercises    Today, patient seen lying in bed, appears very comfortable. Denies any pain or limb weakness. Expressive aphasia noted    Past Medical History:     Past Medical History:   Diagnosis Date    Arthritis         Past Surgical History:     No past surgical history on file. Medications Prior to Admission:     Prior to Admission medications    Not on File        Allergies:     Patient has no known allergies. Social History:     Tobacco:    reports that she has never smoked. She has never used smokeless tobacco.  Alcohol:      reports previous alcohol use. Drug Use:  reports no history of drug use. Family History:     No family history on file. Review of Systems:     Positive and Negative as described in HPI. CONSTITUTIONAL:  negative for fevers, chills, sweats, fatigue, weight loss  HEENT:  negative for vision, hearing changes, runny nose, throat pain  RESPIRATORY:  negative for shortness of breath, cough, congestion, wheezing. CARDIOVASCULAR:  negative for chest pain, palpitations.   GASTROINTESTINAL:  negative for nausea, vomiting, diarrhea, constipation, change in bowel habits, abdominal pain   GENITOURINARY:  negative for difficulty of urination, burning with urination, frequency   INTEGUMENT:  negative for rash, skin lesions, easy bruising   HEMATOLOGIC/LYMPHATIC:  negative for swelling/edema   ALLERGIC/IMMUNOLOGIC:  negative for urticaria , itching  ENDOCRINE:  negative increase in drinking, increase in urination, hot or cold intolerance  MUSCULOSKELETAL:  negative joint pains, muscle aches, swelling of joints  NEUROLOGICAL:  negative for headaches, dizziness, lightheadedness, numbness, pain, tingling extremities. Expressive aphasia present      Physical Exam:     /69   Pulse 107   Temp 97.5 °F (36.4 °C) (Oral)   Resp 16   Ht 5' 6\" (1.676 m)   Wt 171 lb (77.6 kg)   SpO2 95%   BMI 27.60 kg/m²   Temp (24hrs), Av.3 °F (36.8 °C), Min:97.5 °F (36.4 °C), Max:98.8 °F (37.1 °C)    No results for input(s): POCGLU in the last 72 hours. No intake or output data in the 24 hours ending 05/15/20 1035    General Appearance:  alert, well appearing, and in no acute distress  Head:  normocephalic, atraumatic. Eye: no icterus, redness, pupils equal and reactive, extraocular eye movements intact, conjunctiva clear  Ear: normal external ear, no discharge, hearing intact  Nose:  no drainage noted  Mouth: mucous membranes moist  Neck: supple, no carotid bruits, thyroid not palpable  Lungs: Bilateral equal air entry, clear to ausculation, no wheezing, rales or rhonchi, normal effort  Cardiovascular: normal rate, regular rhythm, no murmur, gallop, rub.   Abdomen: Soft, nontender, nondistended, normal bowel sounds, no hepatomegaly or splenomegaly  Neurologic: Expressive aphasia present,  there are no new focal motor or sensory deficits, normal muscle tone and bulk, no abnormal sensation, Skin: No gross lesions, rashes, bruising or bleeding on exposed skin area  Extremities:  peripheral pulses palpable, no pedal edema or calf pain with palpation  Psych:normal affect    Investigations: Laboratory Testing:  Recent Results (from the past 24 hour(s))   Basic Metabolic Prof    Collection Time: 05/14/20  5:37 PM   Result Value Ref Range    Glucose 109 (H) 70 - 99 mg/dL    BUN 41 (H) 8 - 23 mg/dL    CREATININE 1.12 (H) 0.50 - 0.90 mg/dL    Bun/Cre Ratio NOT REPORTED 9 - 20    Calcium 9.7 8.6 - 10.4 mg/dL    Sodium 137 135 - 144 mmol/L    Potassium 3.5 (L) 3.7 - 5.3 mmol/L    Chloride 101 98 - 107 mmol/L    CO2 20 20 - 31 mmol/L    Anion Gap 16 9 - 17 mmol/L    GFR Non-African American 47 (L) >60 mL/min    GFR  56 (L) >60 mL/min    GFR Comment          GFR Staging NOT REPORTED    CBC    Collection Time: 05/15/20  6:35 AM   Result Value Ref Range    WBC 8.9 3.5 - 11.0 k/uL    RBC 4.78 4.0 - 5.2 m/uL    Hemoglobin 14.3 12.0 - 16.0 g/dL    Hematocrit 43.8 36 - 46 %    MCV 91.6 80 - 100 fL    MCH 30.0 26 - 34 pg    MCHC 32.7 31 - 37 g/dL    RDW 13.4 11.5 - 14.9 %    Platelets 035 837 - 147 k/uL    MPV 8.2 6.0 - 12.0 fL    NRBC Automated NOT REPORTED per 100 WBC       Imaging/Diagonstics:  Recent data reviewed    Assessment :      Primary Problem  <principal problem not specified>    Active Hospital Problems    Diagnosis Date Noted    Acute CVA (cerebrovascular accident) ([facility] [address] [I63.9] 05/14/2020       Plan:     1. Acute ischemic stroke of left parietal region, expressive aphasia- undergoing speech and language therapy, continue with aspirin Plavix and Lipitor  2. VTE prophylaxis-Lovenox  3. Hypokalemia- replaced while inpatient, recheck-labs ordered. 4. Elevated creatinine, will trend- BMP tomorrow morning. Consultations:   Kirby Jain MD  5/15/2020  10:35 AM    Copy sent to Dr. Sherlyn Carrasco primary care provider on file. Please note that this chart was generated using voice recognition Dragon dictation software.   Although every effort was made to ensure the accuracy of this automated transcription, some errors in transcription may have occurred.

## 2020-05-15 NOTE — PROGRESS NOTES
severity of language impairment, pt oriented to place with two choices. 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 Expressive language impairment marked by difficulty producing novel sentences and naming deficits. Pt able to repeat target words (1-2 syllable). Pt does have some receptive language impairment marked by inconsistent yes/no response 60% accuracy (complex yes/no) and unable to follow multistep commands (2-3 element; 2 element 75% accuracy 3 element 0%)    Speech Diagnosis: apraxia of speech? Communication Diagnosis: severely impaired. Alternative communication (communication board) left on bedside table to assist in expression of wants and needs       Recommendations:  Requires SLP Intervention: Yes  Duration/Frequency of Treatment: 3-5x per week   D/C Recommendations:  To be determined       Plan:   Goals:  Short-term Goals  Goal 1: Pt will state biographical info 80% accuracy   Goal 2: Pt will name functional objects in confrontation  naming/responsive naming tasks 80% accuracy   Goal 3: Pt will answer y/n questions with 90% accuracy   Goal 4: Pt will follow 2-3 step commands 90% accuracy   Goal 5: Pt will communicate wants and needs effectively using AAC with returned demo at 100%  Goal 6: Pt will repeat simple SVO sentences 80% accuracy    Patient/family involved in developing goals and treatment plan: yes, pt    Subjective:     General  Chart Reviewed: Yes  Patient assessed for rehabilitation services?: Yes  Additional Pertinent Hx: Pt has been receiving ST services for the  past few days, known to writer   General Comment  Comments: no swallowing difficulties post stroke  Subjective  Subjective: Pt highly motivated to improve her speech (Pt expressed that this is her goal via y/n questions)  Social/Functional History  Lives With: Son  Type of Home: House              Objective:     Oral/Motor  Oral Motor:

## 2020-05-15 NOTE — PROGRESS NOTES
Physical Therapy    Facility/Department: [facility] ACUTE REHAB  Initial Assessment    NAME: Sheron Bender  : 1940  MRN: [id number]    Date of Service: 5/15/2020    Discharge Recommendations:  Home with assist PRN, Patient would benefit from continued therapy after discharge   PT Equipment Recommendations  Other: TBD    Assessment   Body structures, Functions, Activity limitations: Decreased functional mobility ; Decreased strength;Decreased balance;Decreased safe awareness;Decreased endurance  Assessment: Pt most limited by balance, endurance, and strength deficits. Pt has expressive aphasia and can become frustrated easily. Pt will benefit from intense physical therapy to maximize her functional potential and increase independence for safe d/c. Treatment Diagnosis: Impaired functional mobility 2* Acute CVA  Specific instructions for Next Treatment: progress gait/stairs, balance, fall prevention, gait training  Prognosis: Excellent;Good  Decision Making: Medium Complexity  History: 58-year-old female admitted with difficulty speaking. .  CT noted asymmetric hypoattenuation left parietal temporal lobe likely representing acute ischemia otherwise unremarkable. Outside TPA window. CTA head and neck completed unremarkable. Pt admitted to [facility] 20. Exam: ROM, MMT, bed mobility, transfers, amb, balance, stairs, activity tolerance  Clinical Presentation: Pt alert, pleasant, motivated for therapy. Barriers to Learning: expressive aphasia, safety awareness  REQUIRES PT FOLLOW UP: Yes  Activity Tolerance  Activity Tolerance: Patient Tolerated treatment well       Patient Diagnosis(es): There were no encounter diagnoses. has a past medical history of Arthritis. has no past surgical history on file.     Restrictions  Restrictions/Precautions  Restrictions/Precautions: Fall Risk(Regular diet; telesitter)  Required Braces or Orthoses?: No  Implants present? : (pt denies)  Position Activity Restriction  Other position/activity restrictions: up as tolerated  Vision/Hearing  Vision: Impaired  Vision Exceptions: Wears glasses for reading(pt reports double vision - unable to clarify d/t aphasia)  Hearing: Within functional limits     Subjective  General  Chart Reviewed: Yes  Patient assessed for rehabilitation services?: Yes  Additional Pertinent Hx: 78-year-old female admitted with difficulty speaking. .  CT noted asymmetric hypoattenuation left parietal temporal lobe likely representing acute ischemia otherwise unremarkable. Outside TPA window. CTA head and neck completed unremarkable. Pt admitted to [facility] 5/14/20. Family / Caregiver Present: No  Referring Practitioner: Damaris Bolaños MD  Referral Date : 05/14/20  Diagnosis: Acute CVA  Follows Commands: Within Functional Limits  General Comment  Comments: MRI Brain 5/11/20: Acute infarct within the left parietal lobe with an additional acute  microinfarct within the contralateral parietal lobe. Subjective  Subjective: Pt in bed, agreeable to PT. Requesting to use restroom.    Pain Screening  Patient Currently in Pain: Denies  Vital Signs  Pulse: 114  Patient Currently in Pain: Denies  Oxygen Therapy  SpO2: 96 %  O2 Device: None (Room air)  Patient Observation  Observations: Significant expressive aphasia, Dupuytren's contracture (L 4/5 digits, R 2/4 digits), fast moving/impulsive at times       Orientation  Orientation  Overall Orientation Status: (Unable to state d/t expressive aphasia - can answer yes/no)  Social/Functional History  Social/Functional History  Lives With: Son(son-Ronald)  Type of Home: House  Home Layout: Two level, Able to Live on Main level with bedroom/bathroom(bedroom and full bathroom on first floor)  Home Access: Stairs to enter without rails  Entrance Stairs - Number of Steps: 6  Entrance Stairs - Rails: None  Bathroom Shower/Tub: Tub/Shower unit, Curtain  Bathroom Toilet: Standard  Bathroom Equipment: Grab bars in shower  Bathroom deviated path using RW, small steps  Gait Deviations: Decreased step length;Deviated path  Distance: 13' x2  Comments: Cues for use of RW and safety. Pt is quick moving. Pt requested to use restroom at start of evaluation. Notified RN Nette Salazar to use RW for stability to restroom. Ambulation 2  Surface - 2: level tile  Device 2: No device  Assistance 2: Minimal assistance; Moderate assistance  Quality of Gait 2: fast juliana, deviated path, staggering small steps, narrow LILY  Gait Deviations: Deviated path;Staggers;Decreased step length  Distance: 2MWT: 127', additional 30'  Comments: Fall risk, decreased safety awareness. Ambulation 3  Surface - 3: ramp  Device 3: No device  Assistance 3: Moderate assistance  Quality of Gait 3: slowed juliana, deviated path, narrow LILY, decreased control on descending ramp  Gait Deviations: Staggers; Deviated path  Distance: 10'x2 ascend/descend  Comments: Decreased control down ramp requiring increased assist.   Stairs/Curb  Stairs?: Yes  Stairs  # Steps : 10  Stairs Height: (4\" and 6\")  Rails: Left ascending  Device: No Device  Assistance: Contact guard assistance;Minimal assistance  Comment: Pt encouraged step to pattern, pt intermittently changes between step to and alternating on stairs. Balance  Posture: Good  Sitting - Static: Good;-  Sitting - Dynamic: Good;-  Standing - Static: Fair;+  Standing - Dynamic: Fair;-  Comments: No device, moderrate fall risk per Tinetti 21/28.          Plan   Plan  Times per week: 1.5 hr/day, 5-7 days/week  Specific instructions for Next Treatment: progress gait/stairs, balance, fall prevention, gait training  Current Treatment Recommendations: Strengthening, Gait Training, Patient/Caregiver Education & Training, Equipment Evaluation, Education, & procurement, Stair training, Balance Training, Functional Mobility Training, Endurance Training, Transfer Training, Safety Education & Training, Home Exercise Program  Safety Devices  Type of subject stand, providing support as needed. Evaluate only the ability to stand with or without support. Do not consider the quality of the stance. 3     Can stand with support of only 1 hand  3. Standing Without Support  Examiner:  Have the subject stand without support. Evaluate only the ability to stand with or without support. Do not consider the quality of the stance. 1  Can stand without support for 10 seconds or leans heavily on 1 leg  4. Standing on Non-paretic Leg  Examiner: Have the subject stand on the non-paretic leg. Evaluate only the ability to bear weight entirely on the non-paretic leg. Do not consider how the subject accomplishes the task. 1  Can stand on non-paretic leg for a few seconds  5. Standing on Paretic Leg  Examiner: Have the subject stand on the paretic leg. Evaluate only the ability to bear weight entirely on the paretic leg. Do not consider how the subject accomplishes the task. 1  Can stand on paretic leg for a few seconds    Maintaining Posture SUBTOTAL     8/15    Changing a Posture  6. Supine to Paretic Side Lateral  Examiner:  Begin with the subject in supine on a treatment mat. Instruct the subject to roll to the paretic side (lateral movement). Assist as necessary. Evaluated the subject's performance on the amount of help required. Do not consider the quality of performance. 3  Can perform without help  7. Supine to Non-paretic Side Lateral  Examiner:  Begin with the subject in supine on a treatment mat. Instruct the subject to roll to the non-paretic side (lateral movement). Assist as necessary. Evaluate the subject's Performance on the amount of help required. Do not consider the quality of performance. 3  Can perform without help  8. Supine to Sitting up on the Edge of the Mat   Examiner:  Begin with the subject in supine on a treatment mat. Instruct the subject to come to sitting on the edge of the mat. Assist as necessary.  Evaluate the goals : by D/C  Long term goal 1: Pt to demonstrate Mod I transfers from varied surfaces. Long term goal 2: Pt to amb at least 200' on varied terrain Mod I.  Long term goal 3: Pt to ascend/descend 1 flight of stairs supervision. Long term goal 4: Pt to improve 2MWT to at least 175' Mod I.  Long term goal 5: Pt to improve Tinetti score to at least 25/28 to reduce fall risk at home and in community. Long term goal 6: Pt to improve standing balance to GOOD-.  Long term goal 7: Pt to improve PASS score to 30 to improve overall safe functional mobility. Patient Goals   Patient goals :  \"To move better\"       Therapy Time   Individual Concurrent Group Co-treatment   Time In 0830         Time Out 0925         Minutes 55         Timed Code Treatment Minutes: [address] [facility], [address], PT

## 2020-05-15 NOTE — PROGRESS NOTES
HOB up  @  40 degrees  No c/o of SOB at this time  Helped pt.  To be repositioned in bed  Telesitter cont on at bedside

## 2020-05-15 NOTE — PROGRESS NOTES
Decreased step length;Deviated path  Distance: 300'     BALANCE Posture: Good  Sitting - Static: Good;-(EOB & longsitting without UE/back support)    EXERCISES    Other exercises 1: Seated B LE ther ex, 1.5#, 20x each    Activity Tolerance: Patient Tolerated treatment well     PT Equipment Recommendations  Other: TBD    Patient Education  New Education Provided:  POC, ARU  Learner:patient  Method: explanation       Outcome: acknowledged understanding of     Current Treatment Recommendations: Strengthening, Gait Training, Patient/Caregiver Education & Training, Equipment Evaluation, Education, & procurement, Stair training, Balance Training, Functional Mobility Training, Endurance Training, Transfer Training, Safety Education & Training, Home Exercise Program    Conditions Requiring Skilled Therapeutic Intervention  Body structures, Functions, Activity limitations: Decreased functional mobility ; Decreased strength;Decreased balance;Decreased safe awareness;Decreased endurance  Barriers to Learning: expressive aphasia, safety awareness  REQUIRES PT FOLLOW UP: Yes  Discharge Recommendations: Home with assist PRN;Patient would benefit from continued therapy after discharge    Goals  Short term goals  Time Frame for Short term goals: 5 days  Short term goal 1: Pt to demonstrate IND bed mobility. Short term goal 2: Pt to demonstrate supervision transfers. Short term goal 3: Pt to amb 175' SBA to CGA with device prn. Short term goal 4: Pt to ascend/descend 6 stairs per home set up SBA. Short term goal 5: Pt to improve sitting balance to GOOD. Long term goals  Time Frame for Long term goals : by D/C  Long term goal 1: Pt to demonstrate Mod I transfers from varied surfaces. Long term goal 2: Pt to amb at least 200' on varied terrain Mod I.  Long term goal 3: Pt to ascend/descend 1 flight of stairs supervision.   Long term goal 4: Pt to improve 2MWT to at least 175' Mod I.  Long term goal 5: Pt to improve Tinetti score

## 2020-05-16 LAB
ANION GAP SERPL CALCULATED.3IONS-SCNC: 11 MMOL/L (ref 9–17)
BUN BLDV-MCNC: 40 MG/DL (ref 8–23)
BUN/CREAT BLD: ABNORMAL (ref 9–20)
CALCIUM SERPL-MCNC: 9.5 MG/DL (ref 8.6–10.4)
CHLORIDE BLD-SCNC: 105 MMOL/L (ref 98–107)
CO2: 26 MMOL/L (ref 20–31)
CREAT SERPL-MCNC: 0.95 MG/DL (ref 0.5–0.9)
GFR AFRICAN AMERICAN: >60 ML/MIN
GFR NON-AFRICAN AMERICAN: 56 ML/MIN
GFR SERPL CREATININE-BSD FRML MDRD: ABNORMAL ML/MIN/{1.73_M2}
GFR SERPL CREATININE-BSD FRML MDRD: ABNORMAL ML/MIN/{1.73_M2}
GLUCOSE BLD-MCNC: 107 MG/DL (ref 70–99)
POTASSIUM SERPL-SCNC: 3.6 MMOL/L (ref 3.7–5.3)
SODIUM BLD-SCNC: 142 MMOL/L (ref 135–144)

## 2020-05-16 PROCEDURE — 80048 BASIC METABOLIC PNL TOTAL CA: CPT

## 2020-05-16 PROCEDURE — 97112 NEUROMUSCULAR REEDUCATION: CPT

## 2020-05-16 PROCEDURE — 97116 GAIT TRAINING THERAPY: CPT

## 2020-05-16 PROCEDURE — 97535 SELF CARE MNGMENT TRAINING: CPT

## 2020-05-16 PROCEDURE — 6370000000 HC RX 637 (ALT 250 FOR IP): Performed by: PHYSICAL MEDICINE & REHABILITATION

## 2020-05-16 PROCEDURE — 36415 COLL VENOUS BLD VENIPUNCTURE: CPT

## 2020-05-16 PROCEDURE — 1180000000 HC REHAB R&B

## 2020-05-16 PROCEDURE — 6370000000 HC RX 637 (ALT 250 FOR IP): Performed by: INTERNAL MEDICINE

## 2020-05-16 PROCEDURE — 99232 SBSQ HOSP IP/OBS MODERATE 35: CPT | Performed by: INTERNAL MEDICINE

## 2020-05-16 PROCEDURE — 6360000002 HC RX W HCPCS: Performed by: INTERNAL MEDICINE

## 2020-05-16 PROCEDURE — 97110 THERAPEUTIC EXERCISES: CPT

## 2020-05-16 PROCEDURE — 92507 TX SP LANG VOICE COMM INDIV: CPT

## 2020-05-16 PROCEDURE — 99231 SBSQ HOSP IP/OBS SF/LOW 25: CPT | Performed by: PHYSICAL MEDICINE & REHABILITATION

## 2020-05-16 PROCEDURE — 97530 THERAPEUTIC ACTIVITIES: CPT

## 2020-05-16 RX ORDER — SENNA PLUS 8.6 MG/1
2 TABLET ORAL NIGHTLY PRN
Status: DISCONTINUED | OUTPATIENT
Start: 2020-05-16 | End: 2020-05-26 | Stop reason: HOSPADM

## 2020-05-16 RX ORDER — POTASSIUM CHLORIDE 20 MEQ/1
40 TABLET, EXTENDED RELEASE ORAL PRN
Status: DISCONTINUED | OUTPATIENT
Start: 2020-05-16 | End: 2020-05-26 | Stop reason: HOSPADM

## 2020-05-16 RX ORDER — POTASSIUM CHLORIDE 7.45 MG/ML
10 INJECTION INTRAVENOUS PRN
Status: DISCONTINUED | OUTPATIENT
Start: 2020-05-16 | End: 2020-05-26 | Stop reason: HOSPADM

## 2020-05-16 RX ADMIN — CLOPIDOGREL BISULFATE 75 MG: 75 TABLET ORAL at 07:27

## 2020-05-16 RX ADMIN — ASPIRIN 81 MG: 81 TABLET, COATED ORAL at 07:27

## 2020-05-16 RX ADMIN — POLYETHYLENE GLYCOL 3350 17 G: 17 POWDER, FOR SOLUTION ORAL at 07:27

## 2020-05-16 RX ADMIN — ATORVASTATIN CALCIUM 80 MG: 80 TABLET, FILM COATED ORAL at 21:11

## 2020-05-16 RX ADMIN — ENOXAPARIN SODIUM 40 MG: 40 INJECTION SUBCUTANEOUS at 07:27

## 2020-05-16 NOTE — PROGRESS NOTES
Speech Language Pathology  [facility]  [facility]    Speech Language Treatment Note    Date: 5/16/2020  Patients Name: Isac Santiago  MRN: [id number]  Diagnosis:   Patient Active Problem List   Diagnosis Code    Acute cerebrovascular accident (CVA) ([facility] [address] I63.9    Mixed hyperlipidemia E78.2    Broca's aphasia R47.01    Acute CVA (cerebrovascular accident) ([facility] [address] I63.9       Pain: 0/10    Speech and Language Treatment  Treatment time:  [id number]    Subjective: [x] Alert [x] Cooperative     [] Confused     [] Agitated    [] Lethargic      Objective/Assessment:  Auditory Comprehension:  2 step directions- 75% with cues to decrease impulsivity, 100% c cues. Comparative y/n ?s- 20%, 60% c cues. Verbal Expression:  Confrontation naming- 80%, 90% c phonemic cues, pt. Seems aware of errors and attempts to self correct. Reading Comprehension: Match words to pictures out of field of 3- 50%. Written Language: n/a    Other:     Plan:  [x] Continue ST services    [] Discharge from ST:      Discharge recommendations: [] Inpatient Rehab   [] [place]   [] Outpatient Therapy  [] Follow up at trauma clinic   [] Other:       Treatment completed by: Francine Rowell A.CCC/SLP

## 2020-05-16 NOTE — PROGRESS NOTES
Physical Medicine & Rehabilitation  Progress Note      Subjective:      78year-old female with expressive aphasia secondary to L parietal ischemic CVA. Patient is well, and has had no acute complaints or problems    ROS:  Denies fevers, chills, sweats. No chest pain, palpitations, lightheadedness. Denies coughing, wheezing or shortness of breath. Denies abdominal pain, nausea, diarrhea or constipation. No new areas of joint pain. Denies new areas of numbness or weakness. Denies new anxiety or depression issues. No new skin problems. Rehabilitation:   Progressing in therapies. PT:  Restrictions/Precautions: Fall Risk(Regular diet; telesitter)  Implants present? : (pt denies)  Other position/activity restrictions: up as tolerated   Transfers  Sit to Stand: Contact guard assistance(No AD)  Stand to sit: Contact guard assistance, Stand by assistance(No AD)  Bed to Chair: Contact guard assistance(No AD)  Stand Pivot Transfers: Contact guard assistance(No AD)  Comment: Pt performed transfers without device and with RW. Pt reminded on cueing and safety. Ambulation 1  Surface: level tile  Device: Rolling Walker  Assistance: Contact guard assistance, Stand by assistance  Quality of Gait: Fast moving, no LOB, pt occasionally lifting RW to adjust path, make turns; education & tactile cues for safety with fair return. Path meanders with pt's attention. Gait Deviations: Decreased step length, Deviated path  Distance: 300'   Comments: Cues for use of RW and safety. Pt is quick moving. Pt requested to use restroom at start of evaluation. Notified SARAH Farias to use RW for stability to restroom. Transfers  Sit to Stand: Contact guard assistance(No AD)  Stand to sit: Contact guard assistance, Stand by assistance(No AD)  Bed to Chair: Contact guard assistance(No AD)  Stand Pivot Transfers: Contact guard assistance(No AD)  Comment: Pt performed transfers without device and with RW. Pt reminded on cueing and safety. Ambulation  Ambulation?: Yes  More Ambulation?: Yes  Ambulation 1  Surface: level tile  Device: Rolling Walker  Assistance: Contact guard assistance, Stand by assistance  Quality of Gait: Fast moving, no LOB, pt occasionally lifting RW to adjust path, make turns; education & tactile cues for safety with fair return. Path meanders with pt's attention. Gait Deviations: Decreased step length, Deviated path  Distance: 300'   Comments: Cues for use of RW and safety. Pt is quick moving. Pt requested to use restroom at start of evaluation. Notified RN Joe More to use RW for stability to restroom. Surface: level tile  Ambulation 1  Surface: level tile  Device: Rolling Walker  Assistance: Contact guard assistance, Stand by assistance  Quality of Gait: Fast moving, no LOB, pt occasionally lifting RW to adjust path, make turns; education & tactile cues for safety with fair return. Path meanders with pt's attention. Gait Deviations: Decreased step length, Deviated path  Distance: 300'   Comments: Cues for use of RW and safety. Pt is quick moving. Pt requested to use restroom at start of evaluation. Notified RN Joe More to use RW for stability to restroom. OT:  ADL  Feeding: Setup, Increased time to complete(set up provided for lunch tray)  LE Dressing: (able to don socks AM and PM)  Additional Comments: Patient refuses ADL tasks this date. Reports already performing grooming/bathing/dressing tasks however unable to report assistance needed due to aphasia. Will assess tomorrow.           Balance  Sitting Balance: Supervision(seated EOB unsupported)  Standing Balance: Minimal assistance(CGA-Min A)   Standing Balance  Time: ~2-3 minutes  Activity: AM: mobility to bathroom, standing at sink for hand hygiene  Comment: impulsive, CGA-Min A, VC for location of soap/paper towel (on R side of wall), decreased safety, 0-1 UE support   Functional Mobility  Functional - Mobility Device: (RW (AM) vs no device (PM))  Activity: Other, due to:      1. Ischemic L parietal lobe CVA with Broca's aphasia: PT/OT/speech  for gait, mobility, strengthening, endurance, ADLs, expression, cognition, impulsivity, and self care. On ASA and plavix - plavix scheduled through 6/1.   2. MAGED: IM monitoring - trending BMP. Creatinine improved today  3. Mild hyperglycemia. Monitoring labs  4. Bowel management: on miralax daily. Will add senokot, milk of magensia prn.   5. Internal medicine for medical management  6. DVT prophylaxis:  On Lovenox      Electronically signed by Chai Herrera MD on 5/16/2020 at 9:10 AM      This note is created with the assistance of a speech recognition program.  While intending to generate a document that actually reflects the content of the visit, the document can still have some errors including those of syntax and sound a like substitutions which may escape proof reading. In such instances, actual meaning can be extrapolated by contextual diversion.

## 2020-05-16 NOTE — CONSULTS
[facility] Internal Medicine    CONSULTATION / HISTORY AND PHYSICAL EXAMINATION            Date:   5/16/2020  Patient name:  Ayleen Lindo  Date of admission:  5/14/2020  4:36 PM  MRN:   [id number]  Account:  [de-identified]  YOB: 1940  PCP:    No primary care provider on file. Room:   [address] [place] [address]  Code Status:    Full Code    Physician Requesting Consult: Manisha Quintana MD    Reason for Consult:  Medical management,     Chief Complaint:     No chief complaint on file. Acute ischemic stroke    History Obtained From:     Patient, EMR, nursing staff    History of Present Illness:     Initially admitted on 5/10 for expressive aphasia, noted to have acute ischemic stroke, no TPA given. Started on aspirin, Plavix Lipitor  Admitted to ER you on 5/14 for strengthening exercises    Today, patient seen lying in bed, appears very comfortable. Denies any pain or limb weakness. Expressive aphasia noted    Past Medical History:     Past Medical History:   Diagnosis Date    Arthritis         Past Surgical History:     No past surgical history on file. Medications Prior to Admission:     Prior to Admission medications    Not on File        Allergies:     Patient has no known allergies. Social History:     Tobacco:    reports that she has never smoked. She has never used smokeless tobacco.  Alcohol:      reports previous alcohol use. Drug Use:  reports no history of drug use. Family History:     No family history on file. Review of Systems:     Positive and Negative as described in HPI. CONSTITUTIONAL:  negative for fevers, chills, sweats, fatigue, weight loss  HEENT:  negative for vision, hearing changes, runny nose, throat pain  RESPIRATORY:  negative for shortness of breath, cough, congestion, wheezing. CARDIOVASCULAR:  negative for chest pain, palpitations.   GASTROINTESTINAL:  negative for nausea, vomiting, diarrhea, constipation, change in bowel habits, abdominal pain   GENITOURINARY:  negative for difficulty of urination, burning with urination, frequency   INTEGUMENT:  negative for rash, skin lesions, easy bruising   HEMATOLOGIC/LYMPHATIC:  negative for swelling/edema   ALLERGIC/IMMUNOLOGIC:  negative for urticaria , itching  ENDOCRINE:  negative increase in drinking, increase in urination, hot or cold intolerance  MUSCULOSKELETAL:  negative joint pains, muscle aches, swelling of joints  NEUROLOGICAL:  negative for headaches, dizziness, lightheadedness, numbness, pain, tingling extremities. Expressive aphasia present      Physical Exam:     BP (!) 148/82   Pulse 93   Temp 98.8 °F (37.1 °C) (Oral)   Resp 18   Ht 5' 6\" (1.676 m)   Wt 171 lb (77.6 kg)   SpO2 97%   BMI 27.60 kg/m²   Temp (24hrs), Av.5 °F (36.9 °C), Min:98.1 °F (36.7 °C), Max:98.8 °F (37.1 °C)    No results for input(s): POCGLU in the last 72 hours. Intake/Output Summary (Last 24 hours) at 2020 1228  Last data filed at 5/15/2020 1338  Gross per 24 hour   Intake 275 ml   Output --   Net 275 ml       General Appearance:  alert, well appearing, and in no acute distress  Head:  normocephalic, atraumatic. Eye: no icterus, redness, pupils equal and reactive, extraocular eye movements intact, conjunctiva clear  Ear: normal external ear, no discharge, hearing intact  Nose:  no drainage noted  Mouth: mucous membranes moist  Neck: supple, no carotid bruits, thyroid not palpable  Lungs: Bilateral equal air entry, clear to ausculation, no wheezing, rales or rhonchi, normal effort  Cardiovascular: normal rate, regular rhythm, no murmur, gallop, rub.   Abdomen: Soft, nontender, nondistended, normal bowel sounds, no hepatomegaly or splenomegaly  Neurologic: Expressive aphasia present,  there are no new focal motor or sensory deficits, normal muscle tone and bulk, no abnormal sensation, Skin: No gross lesions, rashes, bruising or bleeding on exposed skin area  Extremities:  peripheral pulses

## 2020-05-16 NOTE — PROGRESS NOTES
[address] [facility]    ACUTE REHABILITATION OCCUPATIONAL THERAPY  DAILY NOTE    Date: 20  Patient Name: Isac Santiago      Room: 7957/7642-70    MRN: [id number]   : 1940  (78 y.o.)  Gender: female   Referring Practitioner: Kellen Nunez MD  Diagnosis: Acute CVA   Additional Pertinent Hx: 22-year-old female admitted with difficulty speaking. .  CT noted asymmetric hypoattenuation left parietal temporal lobe likely representing acute ischemia otherwise unremarkable. Outside TPA window. CTA head and neck completed unremarkable. Pt admitted to [facility] 20. MRI Brain 20: Acute infarct within the left parietal lobe with an additional acute  microinfarct within the contralateral parietal lobe. Restrictions  Restrictions/Precautions: Fall [employer]; telesitter)  Implants present? : (pt denies)  Other position/activity restrictions: up as tolerated  Required Braces or Orthoses?: No    Subjective  Patient Currently in Pain: Denies  Restrictions/Precautions: Fall Risk(Regular diet; telesitter)  Overall Orientation Status: Within Functional Limits          Objective     Balance  Sitting Balance: Supervision(EOB)  Standing Balance: Minimal assistance  Transfers  Stand Step Transfers: Contact guard assistance  Sit to stand: Contact guard assistance  Stand to sit: Contact guard assistance  Transfer Comments: from EOB, VC for safe transfer with RW / no device   Standing Balance  Time: ~10 minutes AM  Activity: AM: mobility within OT gym   Comment: impulsive tendencies, VC for safety and pace   Functional Mobility  Functional - Mobility Device: No device  Activity: Other; To/from bathroom  Assist Level: Minimal assistance  Functional Mobility Comments: impulsive tendencies, VC for safety and pace; no device used - throughout hospital room and OT gym   Toilet Transfers  Toilet - Technique: Ambulating  Equipment Used: Grab bars  Toilet Transfers Comments: from EOB, VC for safe transfer with RW / no device            Additional Activities: Table top activity: FM/cognition/speech: See below comments        Light Housekeeping  Light Housekeeping Level: Other        ADL  Feeding: Independent  Grooming: Contact guard assistance(washing hands standing at sink)  UE Bathing: None  LE Bathing: None  UE Dressing: None  LE Dressing: None  Toileting: Contact guard assistance(able to perform toilet transfer CGA, hygiene seated, clothing management CGA)  Additional Comments: Refuses ADL tasks today except toileting. Agreeable to shower/change clothes tomorrow during OT     Instrumental ADL's  Instrumental ADLs: Yes  Meal Prep  Meal Prep Level: Other  Light Housekeeping  Light Housekeeping Level: Other    - Functional mobility in kitchen- functional reaching up/down in shelves/cabinets, drawers- naming objects (eating utensils, bowls/cups/mixing cups) and various food items), functional reaching in fridge/freezer, functional trial use of microwave. CGA required and VC for safety during mobility, cues and increased time for naming objects. - Seated activity naming familiar items (shampoo bottle, pill bottle, nail polish, eye drops, ect.) conversation about use of items - open/close w/o difficulty  - Look to the left paper activities: clock drawing: instructed pt to draw clock to \"3 o'clock\" pt nunu full [place], and three small clocks (all to 3 o'clock w/o numbers) where 1, 2, 3 would be located on clock. [employer] and person, 2 windows/roof/door on house, 2 eyes, mouth, 2 arms/legs on person. \"[place] the H's\": circled all H's correctly, circled one \"T\", one \"O\", one \"Q\" extra. - Seated \"Jenga\" in PM: instructed pt to \"build\" [address], emphasis on slow/controlled movements to build tower (pt has impulsive tendencies) pt knocked down Jenga 1x. Instructed pt in rules of Jenga game     Assessment  Performance deficits / Impairments: Decreased functional mobility ; Decreased endurance;Decreased perform functional transfers/mobility during ADL routine with Modified [place] using DME as appropriate with Good safety  Long term goal 2: perform BADL tasks with independence/modified independence using compensatory techniques/DME as appropriate with Good safety  Long term goal 3: perform IADL tasks such as laundry/simple meal prep with independence/modified independence using DME as appropriate with Good safety  Long term goal 4: V/D Good understanding of BUE HEP for maintaining strength/coordination for functional tasks  Long term goal 5: V/D Good understanding of DME for increased safety during ADL routine for safe d/c home   Timed Code Treatment Minutes: 56 Minutes     OT Individual Minutes  Time in [facility]  Time out [address]    Time in [phone]  Time out 601 [address]  Minutes 35    Electronically signed by Liliam Plummer OT on 5/16/20 at 4:18 PM EDT

## 2020-05-17 PROCEDURE — 6370000000 HC RX 637 (ALT 250 FOR IP): Performed by: PHYSICAL MEDICINE & REHABILITATION

## 2020-05-17 PROCEDURE — 6360000002 HC RX W HCPCS: Performed by: INTERNAL MEDICINE

## 2020-05-17 PROCEDURE — 97110 THERAPEUTIC EXERCISES: CPT

## 2020-05-17 PROCEDURE — 97116 GAIT TRAINING THERAPY: CPT

## 2020-05-17 PROCEDURE — 97530 THERAPEUTIC ACTIVITIES: CPT

## 2020-05-17 PROCEDURE — 6370000000 HC RX 637 (ALT 250 FOR IP): Performed by: INTERNAL MEDICINE

## 2020-05-17 PROCEDURE — 97535 SELF CARE MNGMENT TRAINING: CPT

## 2020-05-17 PROCEDURE — 1180000000 HC REHAB R&B

## 2020-05-17 PROCEDURE — 99232 SBSQ HOSP IP/OBS MODERATE 35: CPT | Performed by: INTERNAL MEDICINE

## 2020-05-17 RX ADMIN — ENOXAPARIN SODIUM 40 MG: 40 INJECTION SUBCUTANEOUS at 08:12

## 2020-05-17 RX ADMIN — ASPIRIN 81 MG: 81 TABLET, COATED ORAL at 08:12

## 2020-05-17 RX ADMIN — ATORVASTATIN CALCIUM 80 MG: 80 TABLET, FILM COATED ORAL at 20:43

## 2020-05-17 RX ADMIN — POLYETHYLENE GLYCOL 3350 17 G: 17 POWDER, FOR SOLUTION ORAL at 08:12

## 2020-05-17 RX ADMIN — CLOPIDOGREL BISULFATE 75 MG: 75 TABLET ORAL at 08:12

## 2020-05-17 ASSESSMENT — PAIN SCALES - GENERAL: PAINLEVEL_OUTOF10: 0

## 2020-05-17 NOTE — PROGRESS NOTES
[facility] [address]   ACUTE REHABILITATION OCCUPATIONAL THERAPY  DAILY NOTE    Date: 20  Patient Name: Sid Posadas      Room: 6399/9178-76    MRN: [id number]   : 1940  (78 y.o.)  Gender: female   Referring Practitioner: Jesus Freire MD  Diagnosis: Acute CVA   Additional Pertinent Hx: 69-year-old female admitted with difficulty speaking. .  CT noted asymmetric hypoattenuation left parietal temporal lobe likely representing acute ischemia otherwise unremarkable. Outside TPA window. CTA head and neck completed unremarkable. Pt admitted to [facility] 20. MRI Brain 20: Acute infarct within the left parietal lobe with an additional acute  microinfarct within the contralateral parietal lobe. Restrictions  Restrictions/Precautions: Fall Risk(regular diet , telesitter)  Implants present? : (Pt denies)  Other position/activity restrictions: up as tolerated  Required Braces or Orthoses?: No    Subjective  Subjective: When OT asked pt how her shower was -pt states she feels rejuvenated. Comments: Pt agreeable to a.m shower. Patient Currently in Pain: Denies  Restrictions/Precautions: Fall Risk(regular diet , telesitter)  Orientation Level: Oriented to place;Oriented to person;Oriented to situation          Objective     Balance  Sitting Balance: Independent(Pt sat on shower chair, and at EOB)  Standing Balance: Minimal assistance  Transfers  Sit to stand: Stand by assistance;Contact guard assistance  Stand to sit: Stand by assistance  Standing Balance  Time: A.M 1 minute increments during ADL, 4 minutes during UE balloon exercise   Activity: A.M - Pt stands during ADL shower,transfers,oral care at sink, stand tolerance & balance activity w pt reaching w bilateral UE for balloons.    Functional Mobility  Functional - Mobility Device: No device  Activity: To/from bathroom  Assist Level: Contact guard assistance  Shower Transfers  Shower - Transfer From: safety and independence with self care and mobility tasks   Long term goals  Time Frame for Long term goals : by discharge, patient will  Long term goal 1: perform functional transfers/mobility during ADL routine with Modified [place] using DME as appropriate with Good safety  Long term goal 2: perform BADL tasks with independence/modified independence using compensatory techniques/DME as appropriate with Good safety  Long term goal 3: perform IADL tasks such as laundry/simple meal prep with independence/modified independence using DME as appropriate with Good safety  Long term goal 4: V/D Good understanding of BUE HEP for maintaining strength/coordination for functional tasks  Long term goal 5: V/D Good understanding of DME for increased safety during ADL routine for safe d/c home   Timed Code Treatment Minutes: 58 Minutes     05/17/20 1039   OT Individual Minutes   Time In 1039   Time Out 1137   Minutes 58   Time Code Minutes    Timed Code Treatment Minutes 58 Minutes     Electronically signed by Annamarie Corcoran OT on 5/17/20 at 3:47 PM EDT

## 2020-05-17 NOTE — CONSULTS
abdominal pain   GENITOURINARY:  negative for difficulty of urination, burning with urination, frequency   INTEGUMENT:  negative for rash, skin lesions, easy bruising   HEMATOLOGIC/LYMPHATIC:  negative for swelling/edema   ALLERGIC/IMMUNOLOGIC:  negative for urticaria , itching  ENDOCRINE:  negative increase in drinking, increase in urination, hot or cold intolerance  MUSCULOSKELETAL:  negative joint pains, muscle aches, swelling of joints  NEUROLOGICAL:  negative for headaches, dizziness, lightheadedness, numbness, pain, tingling extremities. Expressive aphasia present      Physical Exam:     BP (!) 144/79   Pulse 85   Temp 97.8 °F (36.6 °C) (Oral)   Resp 16   Ht 5' 6\" (1.676 m)   Wt 171 lb (77.6 kg)   SpO2 98%   BMI 27.60 kg/m²   Temp (24hrs), Av.1 °F (36.7 °C), Min:97.8 °F (36.6 °C), Max:98.4 °F (36.9 °C)    No results for input(s): POCGLU in the last 72 hours. No intake or output data in the 24 hours ending 20 1505    General Appearance:  alert, well appearing, and in no acute distress  Head:  normocephalic, atraumatic. Eye: no icterus, redness, pupils equal and reactive, extraocular eye movements intact, conjunctiva clear  Ear: normal external ear, no discharge, hearing intact  Nose:  no drainage noted  Mouth: mucous membranes moist  Neck: supple, no carotid bruits, thyroid not palpable  Lungs: Bilateral equal air entry, clear to ausculation, no wheezing, rales or rhonchi, normal effort  Cardiovascular: normal rate, regular rhythm, no murmur, gallop, rub.   Abdomen: Soft, nontender, nondistended, normal bowel sounds, no hepatomegaly or splenomegaly  Neurologic: Expressive aphasia present,  there are no new focal motor or sensory deficits, normal muscle tone and bulk, no abnormal sensation, Skin: No gross lesions, rashes, bruising or bleeding on exposed skin area  Extremities:  peripheral pulses palpable, no pedal edema or calf pain with palpation  Psych:normal affect    Investigations:

## 2020-05-17 NOTE — PROGRESS NOTES
Activity: PM: standing during table top activity ([place] rate of manipulation test) ; standing bean bag toss  Comment: PM: SBA-CGA for table top activity 0-1 UE support on table ; CGA bean bag toss 0 UE support   Functional Mobility  Functional - Mobility Device: No device  Activity: Other(within OT gym)  Assist Level: Minimal assistance  Functional Mobility Comments: impulsive tendencies, VC for safety and pace; no device used - throughout OT gym ; BUE propulsion for w/c mobility to/from OT gym as strengthening/endurance exercise                              ADL  Additional Comments: See AM ADL note for details. Assessment  Performance deficits / Impairments: Decreased functional mobility ; Decreased endurance;Decreased strength;Decreased ADL status; Decreased safe awareness;Decreased balance;Decreased coordination  Prognosis: Good  Discharge Recommendations: Patient would benefit from continued therapy after discharge;Home with assist PRN  Activity Tolerance: Patient Tolerated treatment well  Safety Devices in place: Yes  Type of devices: Left in chair;Call light within reach;Gait belt;Nurse notified(telesitter in room; pt son present at end of PM session)          Patient Education:  Patient Goals   Patient goals : to go home  Learner:patient  Method: demonstration and explanation       Outcome: needs reinforcement   OT Education  OT Education: OT Role;Plan of Care;Precautions; ADL Adaptive Strategies;Transfer Training;Energy Conservation;Orientation;Equipment; Family Education    Plan  Plan  Times per week: 1.5 hr/day, 5-7x/wk   Times per day: Twice a day  Current Treatment Recommendations: Strengthening, Functional Mobility Training, Patient/Caregiver Education & Training, Endurance Training, Equipment Evaluation, Education, & procurement, Balance Training, Safety Education & Training, Self-Care / ADL, Home Management Training, Cognitive/Perceptual Training  Patient Goals   Patient goals : to go

## 2020-05-18 PROCEDURE — 97116 GAIT TRAINING THERAPY: CPT

## 2020-05-18 PROCEDURE — 1180000000 HC REHAB R&B

## 2020-05-18 PROCEDURE — 6370000000 HC RX 637 (ALT 250 FOR IP): Performed by: INTERNAL MEDICINE

## 2020-05-18 PROCEDURE — 6360000002 HC RX W HCPCS: Performed by: INTERNAL MEDICINE

## 2020-05-18 PROCEDURE — 97112 NEUROMUSCULAR REEDUCATION: CPT

## 2020-05-18 PROCEDURE — 99231 SBSQ HOSP IP/OBS SF/LOW 25: CPT | Performed by: INTERNAL MEDICINE

## 2020-05-18 PROCEDURE — 97110 THERAPEUTIC EXERCISES: CPT

## 2020-05-18 PROCEDURE — 99231 SBSQ HOSP IP/OBS SF/LOW 25: CPT | Performed by: PHYSICAL MEDICINE & REHABILITATION

## 2020-05-18 PROCEDURE — 92507 TX SP LANG VOICE COMM INDIV: CPT

## 2020-05-18 RX ADMIN — CLOPIDOGREL BISULFATE 75 MG: 75 TABLET ORAL at 07:10

## 2020-05-18 RX ADMIN — ASPIRIN 81 MG: 81 TABLET, COATED ORAL at 07:11

## 2020-05-18 RX ADMIN — ATORVASTATIN CALCIUM 80 MG: 80 TABLET, FILM COATED ORAL at 19:59

## 2020-05-18 RX ADMIN — ENOXAPARIN SODIUM 40 MG: 40 INJECTION SUBCUTANEOUS at 07:15

## 2020-05-18 NOTE — PROGRESS NOTES
[address] [facility]    ACUTE REHABILITATION OCCUPATIONAL THERAPY  DAILY NOTE    Date: 20  Patient Name: Wendy Cormier      Room: 8814/2529-91    MRN: [id number]   : 1940  (78 y.o.)  Gender: female   Referring Practitioner: Lurdes Stevenson MD  Diagnosis: Acute CVA   Additional Pertinent Hx: 78-year-old female admitted with difficulty speaking. .  CT noted asymmetric hypoattenuation left parietal temporal lobe likely representing acute ischemia otherwise unremarkable. Outside TPA window. CTA head and neck completed unremarkable. Pt admitted to [facility] 20. MRI Brain 20: Acute infarct within the left parietal lobe with an additional acute  microinfarct within the contralateral parietal lobe. Restrictions  Restrictions/Precautions: Fall Risk(regular diet , telesitter)  Implants present? : (pt denies)  Other position/activity restrictions: up as tolerated  Required Braces or Orthoses?: No    Subjective  Subjective: \"clean clothes\" pt reports having clean clothes on this AM. Declines ADLs, reports shower yesterday  Comments: Agreeable to grooming at sink and to go to OT gym  Patient Currently in Pain: Denies  Restrictions/Precautions: Fall Risk(regular diet , telesitter)  Overall Orientation Status: Within Functional Limits          Objective  Cognition  Overall Cognitive Status: Exceptions  Arousal/Alertness: Appropriate responses to stimuli  Following Commands: Follows all commands without difficulty  Attention Span: Appears intact; Attends with cues to redirect  Memory: Appears intact  Safety Judgement: Decreased awareness of need for assistance;Decreased awareness of need for safety  Insights: Decreased awareness of deficits  Initiation: Does not require cues  Sequencing: Does not require cues  Cognition Comment: expressive aphasia, quick movements, can be impulsive   Balance  Sitting Balance: Independent(seated EOB to don TEDs/shoes)  Standing Balance: Contact guard eleven o'clock\"   -MOCA: unable to complete assessment due to aphasia. Able to name animals (lion, rhino, camel), attempted drawing cube, and attempting drawing a clock to 6 o'clock- again drawing full [place], clock arms to 6 o'clock however no numbers- L shape down clock arms                  ADL  Grooming: Stand by assistance(standing at sink, hand hygiene, oral care, hair care)  LE Bathing: Stand by assistance(seated EOB, donning TEDs and slip on shoes)  Additional Comments: Agreeable only to grooming tasks at sink and donning socks/shoes, reports clean clothes and showered yesterday. Assessment  Performance deficits / Impairments: Decreased functional mobility ; Decreased endurance;Decreased strength;Decreased ADL status; Decreased safe awareness;Decreased balance;Decreased coordination  Prognosis: Good  Discharge Recommendations: Patient would benefit from continued therapy after discharge;Home with assist PRN  Activity Tolerance: Patient Tolerated treatment well  Safety Devices in place: Yes  Type of devices: Left in chair;Call light within reach;Gait belt;Nurse notified(telesitter in room)          Patient Education:  Patient Goals   Patient goals : to go home  Learner:patient  Method: demonstration and explanation       Outcome: needs reinforcement   OT Education  OT Education: OT Role;Plan of Care;Precautions; ADL Adaptive Strategies;Transfer Training;Energy Conservation;Orientation;Equipment; Family Education  Barriers to Learning: aphasia    Plan  Plan  Times per week: 1.5 hr/day, 5-7x/wk   Times per day: Twice a day  Current Treatment Recommendations: Strengthening, Functional Mobility Training, Patient/Caregiver Education & Training, Endurance Training, Equipment Evaluation, Education, & procurement, Balance Training, Safety Education & Training, Self-Care / ADL, Home Management Training, Cognitive/Perceptual Training  Patient Goals   Patient goals : to go home  Short term goals  Time Frame for

## 2020-05-18 NOTE — PROGRESS NOTES
Speech Language Pathology  Speech Language Pathology  [facility]    Speech Language Treatment Note    Date: 5/18/2020  Patients Name: Wanda Butler  MRN: [id number]  Diagnosis:   Patient Active Problem List   Diagnosis Code    Acute cerebrovascular accident (CVA) ([facility] [address] I63.9    Mixed hyperlipidemia E78.2    Broca's aphasia R47.01    Acute CVA (cerebrovascular accident) ([facility] [address] I63.9       Pain: 0/10    Speech and Language Treatment  Treatment time:  2786-3911    Subjective: [x] Alert [x] Cooperative     [] Confused     [] Agitated    [] Lethargic      Objective/Assessment:  Auditory Comprehension:  Pt. Able to answer ? Re: picture she is looking at with 80% accuracy, 100% c cues for expressive word production. Verbal Expression:  Responsive naming- 70%, 100% c cues. Pt. Circumlocuting, Shanda correcting phonemic errors. Pt. Repeated words in increasing complexity c 100% accuracy. Pt. Repeated sentences in increasing complexity c 40% accuracy. Reading Comprehension: n/a    Written Language: n/a    Other:     Plan:  [x] Continue ST services    [] Discharge from ST:      Discharge recommendations: [] Inpatient Rehab   [] [place]   [] Outpatient Therapy  [] Follow up at trauma clinic   [] Other:       Treatment completed by: Kirsty Zhao A.CCC/SLP

## 2020-05-18 NOTE — PROGRESS NOTES
[facility] Internal Medicine    CONSULTATION / HISTORY AND PHYSICAL EXAMINATION            Date:   5/18/2020  Patient name:  Wanda Butler  Date of admission:  5/14/2020  4:36 PM  MRN:   [id number]  Account:  [de-identified]  YOB: 1940  PCP:    No primary care provider on file. Room:   [address] [place] [address]  Code Status:    DNR-CCA    Physician Requesting Consult: Alicia Gonzalez MD    Reason for Consult:  Medical management,     Chief Complaint:     No chief complaint on file. Acute ischemic stroke    History Obtained From:     Patient, EMR, nursing staff    History of Present Illness:     Initially admitted on 5/10 for expressive aphasia, noted to have acute ischemic stroke, no TPA given. Started on aspirin, Plavix Lipitor  Admitted to ER you on 5/14 for strengthening exercises  5/18   patient seen lying in bed, appears very comfortable. Denies any pain or limb weakness. Expressive aphasia Improving     Past Medical History:     Past Medical History:   Diagnosis Date    Arthritis         Past Surgical History:     No past surgical history on file. Medications Prior to Admission:     Prior to Admission medications    Not on File        Allergies:     Patient has no known allergies. Social History:     Tobacco:    reports that she has never smoked. She has never used smokeless tobacco.  Alcohol:      reports previous alcohol use. Drug Use:  reports no history of drug use. Family History:     No family history on file. Review of Systems:     Positive and Negative as described in HPI. CONSTITUTIONAL:  negative for fevers, chills, sweats, fatigue, weight loss  HEENT:  negative for vision, hearing changes, runny nose, throat pain  RESPIRATORY:  negative for shortness of breath, cough, congestion, wheezing. CARDIOVASCULAR:  negative for chest pain, palpitations.   GASTROINTESTINAL:  negative for nausea, vomiting, diarrhea, constipation, change in bowel habits, abdominal pain   GENITOURINARY:  negative for difficulty of urination, burning with urination, frequency   INTEGUMENT:  negative for rash, skin lesions, easy bruising   HEMATOLOGIC/LYMPHATIC:  negative for swelling/edema   ALLERGIC/IMMUNOLOGIC:  negative for urticaria , itching  ENDOCRINE:  negative increase in drinking, increase in urination, hot or cold intolerance  MUSCULOSKELETAL:  negative joint pains, muscle aches, swelling of joints  NEUROLOGICAL:  negative for headaches, dizziness, lightheadedness, numbness, pain, tingling extremities. Expressive aphasia present      Physical Exam:     BP (!) 149/84   Pulse 98   Temp 98.2 °F (36.8 °C) (Oral)   Resp 18   Ht 5' 6\" (1.676 m)   Wt 171 lb (77.6 kg)   SpO2 97%   BMI 27.60 kg/m²   Temp (24hrs), Av.1 °F (36.7 °C), Min:97.9 °F (36.6 °C), Max:98.2 °F (36.8 °C)    No results for input(s): POCGLU in the last 72 hours. No intake or output data in the 24 hours ending 20    General Appearance:  alert, well appearing, and in no acute distress  Head:  normocephalic, atraumatic. Eye: no icterus, redness, pupils equal and reactive, extraocular eye movements intact, conjunctiva clear  Ear: normal external ear, no discharge, hearing intact  Nose:  no drainage noted  Mouth: mucous membranes moist  Neck: supple, no carotid bruits, thyroid not palpable  Lungs: Bilateral equal air entry, clear to ausculation, no wheezing, rales or rhonchi, normal effort  Cardiovascular: normal rate, regular rhythm, no murmur, gallop, rub.   Abdomen: Soft, nontender, nondistended, normal bowel sounds, no hepatomegaly or splenomegaly  Neurologic: Expressive aphasia present,  there are no new focal motor or sensory deficits, normal muscle tone and bulk, no abnormal sensation, Skin: No gross lesions, rashes, bruising or bleeding on exposed skin area  Extremities:  peripheral pulses palpable, no pedal edema or calf pain with palpation  Psych:normal affect    Investigations: Laboratory Testing:  No results found for this or any previous visit (from the past 24 hour(s)). Imaging/Diagonstics:  Recent data reviewed    Assessment :      Primary Problem  <principal problem not specified>    Active Hospital Problems    Diagnosis Date Noted    Acute CVA (cerebrovascular accident) ([facility] [address] [I63.9] 05/14/2020    Mixed hyperlipidemia [E78.2] 05/11/2020       Plan:     1. Acute ischemic stroke of left parietal region, expressive aphasia- undergoing speech and language therapy, continue with aspirin Plavix and Lipitor  2. VTE prophylaxis-Lovenox  3. Hypokalemia- replaced while inpatient, recheck-labs ordered. 4. Acute kidney injury improved . 5.  hypokalemia replace sliding scale oral  6. DNR CC ARREST    Consultations:   IP CONSULT TO DIETITIAN  IP CONSULT TO SOCIAL WORK  IP CONSULT TO RECREATION THERAPY  IP CONSULT TO INTERNAL MEDICINE      Jennifer Faustin MD  5/18/2020  7:49 PM    Copy sent to Dr. Negrito Leonardo primary care provider on file. Please note that this chart was generated using voice recognition Dragon dictation software. Although every effort was made to ensure the accuracy of this automated transcription, some errors in transcription may have occurred.

## 2020-05-18 NOTE — PATIENT CARE CONFERENCE
Short Term Goal: supervision level comprehension, mod A expression. RECREATIONAL THERAPY  Comment/Participation: Participating in unit program      NUTRITION  Weight: 171 lb (77.6 kg) / Body mass index is 27.6 kg/m². Diet: General. Patient is eating fair at meals with 50% intakes at most meals. Please see nutrition note for details.     SOCIAL WORK ASSESSMENT  Assessment:son   Pre-Admission Status:  Lives With: Son(son-Ronald)  Type of Home: House  Home Layout: Two level, Able to Live on Main level with bedroom/bathroom(bedroom and full bathroom on first floor)  Home Access: Stairs to enter without rails  Entrance Stairs - Number of Steps: 6  Entrance Stairs - Rails: None  Bathroom Shower/Tub: Tub/Shower unit, Curtain  Bathroom Toilet: Standard  Bathroom Equipment: Grab bars in shower  Bathroom Accessibility: Accessible  Home Equipment: Rolling walker  ADL Assistance: Independent  Homemaking Assistance: Independent  Homemaking Responsibilities: Yes(cooking, cleaning, laundry, grocery shopping)  Ambulation Assistance: Independent(no device)  Transfer Assistance: Independent  Active : Yes  Mode of Transportation: Car  Occupation: Retired  Type of occupation: Bank  Leisure & Hobbies: reading  IADL Comments: sleeps in regular bed  Additional Comments: pt lives with son- works M-F: pt alone during day      Family Education: Need to make contact with family to initiate education    Risk for Readmission: Moderate 10-13.9   Readmission Risk              Risk of Unplanned Readmission:        12         Critical Items: None       Problem / Barrier Intervention / Plan  Results   Impaired functional mobility 2* CVA Progress bed mobility, transfers, amb, and stairs to exit/enter home with AD as needed, family training, safe techniques, patient education    Fall risk with R neglect Static/dynamic balance activities, scanning environment, fall prevention    Impaired comprehension/expression Language retraining understanding of BUE HEP for maintaining strength/coordination for functional tasks  Long term goal 5: V/D Good understanding of DME for increased safety during ADL routine for safe d/c home   ST: mod I comprehension, min A expression    Team Members Present at Conference:  :  [facility], [address] [place] [address]  Occupational Therapist: Melanie Cabrera OT   [address] PT  Speech Therapist: Sue GTZCCC/SLP  Nurse: Leila Rojas RN    Recreation Therapy: Marcela Villegas  Dietary/Nutrition: Ernesto Wilson RD LD    Pastoral Care: [facility]  CMG:   Jennifer Saldana RN     I approve the established interdisciplinary plan of care as documented within the medical record of Ayleen Lindo.     Rena Qureshi MD

## 2020-05-18 NOTE — PROGRESS NOTES
Physical Therapy    [address]  Acute Rehabilitation Physical Therapy Progress Note    Date: 20  Patient Name: Ann-Marie Howard       Room: 4437/7457-78  MRN: [id number]   Account: [de-identified]   : 1940  (78 y.o.) Gender: female     Referring Practitioner: Muna Malik MD  Diagnosis: Acute CVA   Past Medical History:  has a past medical history of Arthritis. Past Surgical History:   has no past surgical history on file. Additional Pertinent Hx: 64-year-old female admitted with difficulty speaking. .  CT noted asymmetric hypoattenuation left parietal temporal lobe likely representing acute ischemia otherwise unremarkable. Outside TPA window. CTA head and neck completed unremarkable. Pt admitted to [facility] 20.         Restrictions/Precautions  Restrictions/Precautions: [employer] , telesitter)  Required Braces or Orthoses?: No  Implants present? : (Pt denies)  Position Activity Restriction  Other position/activity restrictions: up as tolerated            Vital Signs  Patient Currently in Pain: Denies       Bed Mobility:   Bed Mobility  Rolling: Supervision;Rolling Left;Rolling Right  Supine to Sit: Supervision  Sit to Supine: Supervision  Scooting: Supervision  Comment: flat surface 1 pillow  Bed mobility  Scooting: Supervision    Transfers:  Sit to Stand: Stand by assistance  Stand to sit: Stand by assistance  Bed to Chair: Stand by assistance(with and without AD)              Ambulation 1  Surface: level tile  Device: Rolling Walker  Assistance: Stand by assistance  Quality of Gait: NBOS, pt crosses midline with advancement of LE's vc's to correct, steady pace, no LOB  Gait Deviations: (NBOS)  Distance: 200ft  Ambulation 2  Surface - 2: level tile;ramp  Device 2: No device  Assistance 2: Contact guard assistance  Quality of Gait 2: slightly unsteady with NBOS, several little LOB with self corrections made  Gait Deviations: Deviated path  Distance: 230ft  Comments: vc's to slow down, scan environment and widen LILY with little carryover  Ambulation 3  Surface - 3: level tile  Device 3: No device  Assistance 3: Contact guard assistance  Quality of Gait 3: slowed juliana, deviated path, narrow LILY with tendency to cross midline, mild LOB with self correction  Gait Deviations: Slow Juliana;Decreased step length;Deviated path  Distance: 200ft  Comments: same as above.  Safety concerns due to slight (R) neglect and being impulsive  Stairs/Curb  Stairs?: Yes  Stairs  # Steps : 10  Stairs Height: 6\"  Rails: Right ascending(Pt started with B hand rails and then progressed to R rail)  Device: No Device  Assistance: Stand by assistance  Comment: Pt able to perform step to and step through gait pattern, slightly impulsive       BALANCE Posture: Good  Sitting - Static: Good  Sitting - Dynamic: Good  Standing - Static: Good;-  Standing - Dynamic: Fair  Comments: seated EOB and standing with and without RW    EXERCISES    Other exercises?: Yes  Other exercises 1: Seated (B) LE 2# x 20   Other exercises 2: Seated (B) LE lime green t-band x 20  Other exercises 3: low negar step over: fwd /lat  Other exercises 4: static and dynamic bal activities  Other exercises 5: amb fwd/ikayw-VYA-vdsctleg unsteady  Other exercises 6: karioke x 20ft ea direction  Other exercises 7: standing balloon bat: 2 mins no LOB  Other exercises 8: NU-step L4 x 10min  Other exercises 9: picking objects up off floor-CGA for safety, no LOB           Activity Tolerance: Patient Tolerated treatment well  Activity Tolerance: safety concerns- pt impulsive  PT Equipment Recommendations  Other: TBD       Patient Education  New Education Provided:  safety with amb, gait training, transfer training, bal activities  Learner:patient  Method: demonstration and explanation       Outcome: demonstrated understanding and needs reinforcement     Current Treatment Recommendations: Strengthening, Gait Training, ascend/descend 1 flight of stairs supervision. Long term goal 4: Pt to improve 2MWT to at least 175' Mod I.  Long term goal 5: Pt to improve Tinetti score to at least 25/28 to reduce fall risk at home and in community. Long term goal 6: Pt to improve standing balance to GOOD-.  Long term goal 7: Pt to improve PASS score to 30 to improve overall safe functional mobility.        05/18/20 0746 05/18/20 1220   PT Individual Minutes   Time In 0740 1215   Time Out 0830 1255   Minutes 50 40       Electronically signed by Travis Blair on 5/18/20 at 2:54 PM EDT

## 2020-05-19 PROCEDURE — 92507 TX SP LANG VOICE COMM INDIV: CPT

## 2020-05-19 PROCEDURE — 99231 SBSQ HOSP IP/OBS SF/LOW 25: CPT | Performed by: INTERNAL MEDICINE

## 2020-05-19 PROCEDURE — 97530 THERAPEUTIC ACTIVITIES: CPT

## 2020-05-19 PROCEDURE — 1180000000 HC REHAB R&B

## 2020-05-19 PROCEDURE — 97110 THERAPEUTIC EXERCISES: CPT

## 2020-05-19 PROCEDURE — 6370000000 HC RX 637 (ALT 250 FOR IP): Performed by: INTERNAL MEDICINE

## 2020-05-19 PROCEDURE — 97116 GAIT TRAINING THERAPY: CPT

## 2020-05-19 PROCEDURE — 97535 SELF CARE MNGMENT TRAINING: CPT

## 2020-05-19 PROCEDURE — 6360000002 HC RX W HCPCS: Performed by: INTERNAL MEDICINE

## 2020-05-19 PROCEDURE — 99231 SBSQ HOSP IP/OBS SF/LOW 25: CPT | Performed by: PHYSICAL MEDICINE & REHABILITATION

## 2020-05-19 RX ADMIN — ATORVASTATIN CALCIUM 80 MG: 80 TABLET, FILM COATED ORAL at 21:00

## 2020-05-19 RX ADMIN — ASPIRIN 81 MG: 81 TABLET, COATED ORAL at 09:40

## 2020-05-19 RX ADMIN — CLOPIDOGREL BISULFATE 75 MG: 75 TABLET ORAL at 09:40

## 2020-05-19 RX ADMIN — ENOXAPARIN SODIUM 40 MG: 40 INJECTION SUBCUTANEOUS at 09:41

## 2020-05-19 NOTE — PROGRESS NOTES
Speech Language Pathology  Speech Language Pathology  [facility]    Speech Language Treatment Note    Date: 5/19/2020  Patients Name: Wendy Cormier  MRN: [id number]  Diagnosis: aphasia   Patient Active Problem List   Diagnosis Code    Acute cerebrovascular accident (CVA) ([facility] [address] [facility] I63.9    Mixed hyperlipidemia E78.2    Broca's aphasia R47.01    Acute CVA (cerebrovascular accident) ([facility] [address] [facility] I63.9       Pain: 0/10    Speech and Language Treatment  Treatment time:  950-1020    Subjective: [x] Alert [x] Cooperative     [] Confused     [] Agitated    [] Lethargic      Objective/Assessment:  Auditory Comprehension:   Yes/no consistent throughout session    Verbal Expression:    Responsive naming (open ended, presented verbally 36% accuracy lacho   Sentence completion 100% with choice of 3, presented visually    Pt continues to use telegraphic speech during picture description task. Sentence elaboration provided to improve syntax. Pt creating simple SVO sentences 22% accuracy lacho  Pt spontaneously produced the sentences: \"he's gonna lose the money\" and \"the phone is out of order\" during picture description task. Reading Comprehension: Pt. Observed reading single words during sentence completion task. Written Language: n/a    Other: Pt continues to benefit from phonemic cues/carrier phrases    Plan:  [x] Continue ST services    [] Discharge from ST:      Discharge recommendations: [] Inpatient Rehab   [] [place]   [] Outpatient Therapy  [] Follow up at trauma clinic   [] Other:       Treatment completed by: Ivania DAHL A.CCC/SLP

## 2020-05-19 NOTE — PROGRESS NOTES
abdominal pain   GENITOURINARY:  negative for difficulty of urination, burning with urination, frequency   INTEGUMENT:  negative for rash, skin lesions, easy bruising   HEMATOLOGIC/LYMPHATIC:  negative for swelling/edema   ALLERGIC/IMMUNOLOGIC:  negative for urticaria , itching  ENDOCRINE:  negative increase in drinking, increase in urination, hot or cold intolerance  MUSCULOSKELETAL:  negative joint pains, muscle aches, swelling of joints  NEUROLOGICAL:  negative for headaches, dizziness, lightheadedness, numbness, pain, tingling extremities. Expressive aphasia present      Physical Exam:     BP (!) 144/71   Pulse 97   Temp 98.1 °F (36.7 °C) (Oral)   Resp 18   Ht 5' 6\" (1.676 m)   Wt 171 lb (77.6 kg)   SpO2 99%   BMI 27.60 kg/m²   Temp (24hrs), Av.2 °F (36.8 °C), Min:98.1 °F (36.7 °C), Max:98.2 °F (36.8 °C)    No results for input(s): POCGLU in the last 72 hours. No intake or output data in the 24 hours ending 20 1731    General Appearance:  alert, well appearing, and in no acute distress  Head:  normocephalic, atraumatic. Eye: no icterus, redness, pupils equal and reactive, extraocular eye movements intact, conjunctiva clear  Ear: normal external ear, no discharge, hearing intact  Nose:  no drainage noted  Mouth: mucous membranes moist  Neck: supple, no carotid bruits, thyroid not palpable  Lungs: Bilateral equal air entry, clear to ausculation, no wheezing, rales or rhonchi, normal effort  Cardiovascular: normal rate, regular rhythm, no murmur, gallop, rub.   Abdomen: Soft, nontender, nondistended, normal bowel sounds, no hepatomegaly or splenomegaly  Neurologic: Expressive aphasia present,  there are no new focal motor or sensory deficits, normal muscle tone and bulk, no abnormal sensation, Skin: No gross lesions, rashes, bruising or bleeding on exposed skin area  Extremities:  peripheral pulses palpable, no pedal edema or calf pain with palpation  Psych:normal affect    Investigations:

## 2020-05-19 NOTE — PROGRESS NOTES
notified(telesitter in room )           Plan  Plan  Times per week: 1.5 hr/day, 5-7x/wk   Times per day: Twice a day  Current Treatment Recommendations: Strengthening, Functional Mobility Training, Patient/Caregiver Education & Training, Endurance Training, Equipment Evaluation, Education, & procurement, Balance Training, Safety Education & Training, Self-Care / ADL, Home Management Training, Cognitive/Perceptual Training  Patient Goals   Patient goals : to go home  Short term goals  Time Frame for Short term goals: in 5 days, patient will  Short term goal 1: perform functional transfer with supervision assistance for safety only using DME as appropriate  Short term goal 2: perform toileting routine with supervision assistance for safety only using DME as appropriate   Short term goal 3: increase standing tolerance to 10 minutes with 0-1 UE support during functional task  Short term goal 4: perform UB ADLs with set up / LB ADLs with supervision   Short term goal 5: actively participate in 30 minutes of therapeutic exercise/activity to promote increased safety and independence with self care and mobility tasks   Long term goals  Time Frame for Long term goals : by discharge, patient will  Long term goal 1: perform functional transfers/mobility during ADL routine with Modified [place] using DME as appropriate with Good safety  Long term goal 2: perform BADL tasks with independence/modified independence using compensatory techniques/DME as appropriate with Good safety  Long term goal 3: perform IADL tasks such as laundry/simple meal prep with independence/modified independence using DME as appropriate with Good safety  Long term goal 4: V/D Good understanding of BUE HEP for maintaining strength/coordination for functional tasks  Long term goal 5: V/D Good understanding of DME for increased safety during ADL routine for safe d/c home         05/19/20 1030 05/19/20 1529   OT Individual Minutes   Time In [facility] 76

## 2020-05-19 NOTE — PROGRESS NOTES
Physical Medicine & Rehabilitation  Progress Note      Subjective:      Patient is an 78 y.o. right-handed single  female admitted after stroke and left her with expressive aphasia. Patient was evaluated at bedside and found AAOx3, NAD, afebrile. Refers today feeling good but still with difficulty expressing herself fluently. ROS:  Denies fevers, chills, sweats. No chest pain, palpitations, lightheadedness. Denies coughing, wheezing or shortness of breath. Denies abdominal pain, nausea, diarrhea or constipation. No new areas of joint pain. Denies new areas of numbness or weakness. Denies new anxiety or depression issues. No new skin problems. Rehabilitation:   Progressing in therapies.     PT:  Restrictions/Precautions: Fall Risk(regular diet, telesitter. )  Implants present? : (Pt denies. )  Other position/activity restrictions: up as tolerated   Transfers  Sit to Stand: Stand by assistance  Stand to sit: Stand by assistance  Bed to Chair: Stand by assistance(with and without AD)  Stand Pivot Transfers: Stand by assistance  Comment: Transfers completed with and without AD  Ambulation 1  Surface: level tile  Device: Rolling Walker  Assistance: Stand by assistance  Quality of Gait: NBOS, pt crosses midline with advancement of LE's vc's to correct, steady pace, no LOB  Gait Deviations: (NBOS)  Distance: 200ft  Comments: narrow LILY , decreased left weight shift and hip flexion swing phase     Transfers  Sit to Stand: Stand by assistance  Stand to sit: Stand by assistance  Bed to Chair: Stand by assistance(with and without AD)  Stand Pivot Transfers: Stand by assistance  Comment: Transfers completed with and without AD  Ambulation  Ambulation?: Yes  More Ambulation?: Yes  Ambulation 1  Surface: level tile  Device: Rolling Walker  Assistance: Stand by assistance  Quality of Gait: NBOS, pt crosses midline with advancement of LE's vc's to correct, steady pace, no LOB  Gait Deviations:

## 2020-05-20 LAB
ANION GAP SERPL CALCULATED.3IONS-SCNC: 15 MMOL/L (ref 9–17)
BUN BLDV-MCNC: 30 MG/DL (ref 8–23)
BUN/CREAT BLD: ABNORMAL (ref 9–20)
CALCIUM SERPL-MCNC: 9.2 MG/DL (ref 8.6–10.4)
CHLORIDE BLD-SCNC: 104 MMOL/L (ref 98–107)
CO2: 22 MMOL/L (ref 20–31)
CREAT SERPL-MCNC: 0.86 MG/DL (ref 0.5–0.9)
GFR AFRICAN AMERICAN: >60 ML/MIN
GFR NON-AFRICAN AMERICAN: >60 ML/MIN
GFR SERPL CREATININE-BSD FRML MDRD: ABNORMAL ML/MIN/{1.73_M2}
GFR SERPL CREATININE-BSD FRML MDRD: ABNORMAL ML/MIN/{1.73_M2}
GLUCOSE BLD-MCNC: 101 MG/DL (ref 70–99)
HCT VFR BLD CALC: 42.4 % (ref 36–46)
HEMOGLOBIN: 14.2 G/DL (ref 12–16)
MCH RBC QN AUTO: 30.8 PG (ref 26–34)
MCHC RBC AUTO-ENTMCNC: 33.5 G/DL (ref 31–37)
MCV RBC AUTO: 92 FL (ref 80–100)
NRBC AUTOMATED: NORMAL PER 100 WBC
PDW BLD-RTO: 13 % (ref 11.5–14.9)
PLATELET # BLD: 264 K/UL (ref 150–450)
PMV BLD AUTO: 8.2 FL (ref 6–12)
POTASSIUM SERPL-SCNC: 3.4 MMOL/L (ref 3.7–5.3)
RBC # BLD: 4.61 M/UL (ref 4–5.2)
SODIUM BLD-SCNC: 141 MMOL/L (ref 135–144)
WBC # BLD: 4.4 K/UL (ref 3.5–11)

## 2020-05-20 PROCEDURE — 6370000000 HC RX 637 (ALT 250 FOR IP): Performed by: INTERNAL MEDICINE

## 2020-05-20 PROCEDURE — 85027 COMPLETE CBC AUTOMATED: CPT

## 2020-05-20 PROCEDURE — 99232 SBSQ HOSP IP/OBS MODERATE 35: CPT | Performed by: INTERNAL MEDICINE

## 2020-05-20 PROCEDURE — 97530 THERAPEUTIC ACTIVITIES: CPT

## 2020-05-20 PROCEDURE — 97116 GAIT TRAINING THERAPY: CPT

## 2020-05-20 PROCEDURE — 6360000002 HC RX W HCPCS: Performed by: INTERNAL MEDICINE

## 2020-05-20 PROCEDURE — 97110 THERAPEUTIC EXERCISES: CPT

## 2020-05-20 PROCEDURE — 92507 TX SP LANG VOICE COMM INDIV: CPT

## 2020-05-20 PROCEDURE — 97112 NEUROMUSCULAR REEDUCATION: CPT

## 2020-05-20 PROCEDURE — 80048 BASIC METABOLIC PNL TOTAL CA: CPT

## 2020-05-20 PROCEDURE — 36415 COLL VENOUS BLD VENIPUNCTURE: CPT

## 2020-05-20 PROCEDURE — 97535 SELF CARE MNGMENT TRAINING: CPT

## 2020-05-20 PROCEDURE — 6370000000 HC RX 637 (ALT 250 FOR IP): Performed by: PHYSICAL MEDICINE & REHABILITATION

## 2020-05-20 PROCEDURE — 1180000000 HC REHAB R&B

## 2020-05-20 RX ORDER — ACETAMINOPHEN 325 MG/1
650 TABLET ORAL EVERY 6 HOURS PRN
Status: DISCONTINUED | OUTPATIENT
Start: 2020-05-20 | End: 2020-05-26 | Stop reason: HOSPADM

## 2020-05-20 RX ORDER — POTASSIUM CHLORIDE 20 MEQ/1
20 TABLET, EXTENDED RELEASE ORAL
Status: DISCONTINUED | OUTPATIENT
Start: 2020-05-21 | End: 2020-05-26 | Stop reason: HOSPADM

## 2020-05-20 RX ADMIN — ATORVASTATIN CALCIUM 80 MG: 80 TABLET, FILM COATED ORAL at 20:30

## 2020-05-20 RX ADMIN — POLYETHYLENE GLYCOL 3350 17 G: 17 POWDER, FOR SOLUTION ORAL at 07:03

## 2020-05-20 RX ADMIN — ENOXAPARIN SODIUM 40 MG: 40 INJECTION SUBCUTANEOUS at 07:03

## 2020-05-20 RX ADMIN — ASPIRIN 81 MG: 81 TABLET, COATED ORAL at 07:02

## 2020-05-20 RX ADMIN — CLOPIDOGREL BISULFATE 75 MG: 75 TABLET ORAL at 07:02

## 2020-05-20 RX ADMIN — ACETAMINOPHEN 650 MG: 325 TABLET, FILM COATED ORAL at 15:01

## 2020-05-20 ASSESSMENT — PAIN SCALES - GENERAL: PAINLEVEL_OUTOF10: 4

## 2020-05-20 NOTE — PROGRESS NOTES
Impairments: Decreased functional mobility ; Decreased endurance;Decreased strength;Decreased ADL status; Decreased safe awareness;Decreased balance;Decreased coordination  Prognosis: Good  Discharge Recommendations: Patient would benefit from continued therapy after discharge;Home with assist PRN  Activity Tolerance: Patient Tolerated treatment well  Safety Devices in place: Yes  Type of devices: Left in chair;Call light within reach;Gait belt;Left in bed(telesitter)          Patient Education:  Patient Goals   Patient goals : to go home  Learner:patient  Method: demonstration and explanation       Outcome: needs reinforcement   OT Education  OT Education: OT Role;Plan of Care;Precautions; ADL Adaptive Strategies;Transfer Training;Energy Conservation;Orientation;Equipment; Family Education  Barriers to Learning: aphasia    Plan  Plan  Times per week: 1.5 hr/day, 5-7x/wk   Times per day: Twice a day  Current Treatment Recommendations: Strengthening, Functional Mobility Training, Patient/Caregiver Education & Training, Endurance Training, Equipment Evaluation, Education, & procurement, Balance Training, Safety Education & Training, Self-Care / ADL, Home Management Training, Cognitive/Perceptual Training  Patient Goals   Patient goals : to go home  Short term goals  Time Frame for Short term goals: in 5 days, patient will  Short term goal 1: perform functional transfer with supervision assistance for safety only using DME as appropriate  Short term goal 2: perform toileting routine with supervision assistance for safety only using DME as appropriate   Short term goal 3: increase standing tolerance to 10 minutes with 0-1 UE support during functional task  Short term goal 4: perform UB ADLs with set up / LB ADLs with supervision   Short term goal 5: actively participate in 30 minutes of therapeutic exercise/activity to promote increased safety and independence with self care and mobility tasks   Long term goals  Time Frame

## 2020-05-20 NOTE — PROGRESS NOTES
Laboratory Testing:  Recent Results (from the past 24 hour(s))   CBC    Collection Time: 05/20/20  6:22 AM   Result Value Ref Range    WBC 4.4 3.5 - 11.0 k/uL    RBC 4.61 4.0 - 5.2 m/uL    Hemoglobin 14.2 12.0 - 16.0 g/dL    Hematocrit 42.4 36 - 46 %    MCV 92.0 80 - 100 fL    MCH 30.8 26 - 34 pg    MCHC 33.5 31 - 37 g/dL    RDW 13.0 11.5 - 14.9 %    Platelets 635 424 - 384 k/uL    MPV 8.2 6.0 - 12.0 fL    NRBC Automated NOT REPORTED per 100 WBC   Basic Metabolic Panel    Collection Time: 05/20/20  6:22 AM   Result Value Ref Range    Glucose 101 (H) 70 - 99 mg/dL    BUN 30 (H) 8 - 23 mg/dL    CREATININE 0.86 0.50 - 0.90 mg/dL    Bun/Cre Ratio NOT REPORTED 9 - 20    Calcium 9.2 8.6 - 10.4 mg/dL    Sodium 141 135 - 144 mmol/L    Potassium 3.4 (L) 3.7 - 5.3 mmol/L    Chloride 104 98 - 107 mmol/L    CO2 22 20 - 31 mmol/L    Anion Gap 15 9 - 17 mmol/L    GFR Non-African American >60 >60 mL/min    GFR African American >60 >60 mL/min    GFR Comment          GFR Staging NOT REPORTED        Imaging/Diagonstics:  Recent data reviewed    Assessment :      Primary Problem  <principal problem not specified>    Active Hospital Problems    Diagnosis Date Noted    Acute CVA (cerebrovascular accident) ([facility] [address] [I63.9] 05/14/2020    Mixed hyperlipidemia [E78.2] 05/11/2020       Plan:     1. Acute ischemic stroke of left parietal region, expressive aphasia- undergoing speech and language therapy, continue with aspirin Plavix and Lipitor  2. VTE prophylaxis-Lovenox  3. Hypokalemia- replaced while inpatient, recheck-labs ordered. 4. Acute kidney injury improved . 5.  hypokalemia replace sliding scale oral  6.  DNR CC ARREST  7. 5/20   Patient is doing much better  Started patient on potassium 20 mg daily with persistent hypokalemia      Consultations:   Ottoniel BILL  IP CONSULT TO RECREATION THERAPY  IP CONSULT TO INTERNAL MEDICINE      Nicolette Patel MD  5/20/2020  5:28 PM    Copy sent to

## 2020-05-20 NOTE — PROGRESS NOTES
Nutrition Assessment    Type and Reason for Visit: Reassess    Nutrition Recommendations: Continue current diet. Nutrition Assessment: PO intake appears to be 50-75% of meals. No stool issues reported. Malnutrition Assessment:  · Malnutrition Status: No malnutrition  · Context: Acute illness or injury  · Findings of the 6 clinical characteristics of malnutrition (Minimum of 2 out of 6 clinical characteristics is required to make the diagnosis of moderate or severe Protein Calorie Malnutrition based on AND/ASPEN Guidelines):  1. Energy Intake-Less than or equal to 75% of estimated energy requirement(improving), Greater than or equal to 5 days    2. Weight Loss-No significant weight loss,    3. Fat Loss-Unable to assess,    4. Muscle Loss-Unable to assess,    5. Fluid Accumulation-No significant fluid accumulation, Extremities  6.  Strength-Not measured    Nutrition Risk Level: Moderate    Nutrient Needs:  · Estimated Daily Total Kcal: 1500 based on Mifllin-St. Jeor with 1.2 factor using admission wt  · Estimated Daily Protein (g): 77 based on 1.3 gm per kg Ideal Body Wt    Nutrition Diagnosis:   · Problem: Inadequate oral intake  · Etiology: related to Alteration in GI function, Cognitive or neurological impairment     Signs and symptoms:  as evidenced by Diet history of poor intake    Objective Information:  · Nutrition-Focused Physical Findings: No edema.   · Current Nutrition Therapies:  · Oral Diet Orders: General   · Oral Diet intake: 26-50%, 51-75%  · Anthropometric Measures:  · Ht: 5' 6\" (167.6 cm)   · Current Body Wt: 171 lb 1.2 oz (77.6 kg)  · Admission Body Wt: 171 lb 1.2 oz (77.6 kg)  · Ideal Body Wt: 130 lb (59 kg), % Ideal Body 132%  · BMI Classification: BMI 25.0 - 29.9 Overweight    Nutrition Interventions:   Continue current diet  Continued Inpatient Monitoring    Nutrition Evaluation:   · Evaluation: Progressing toward goals   · Goals: PO intake % of most meals    · Monitoring: Meal Intake, Diet Tolerance, Skin Integrity, Weight, Pertinent Labs, Monitor Bowel Function    Some areas of assessment may be incomplete due to standard COVID-19 Precautions. Niru Cates R.D., L.D.   Phone: [phone]

## 2020-05-20 NOTE — PROGRESS NOTES
Physical Medicine & Rehabilitation  Progress Note      Subjective:      Patient is an 78 y.o. right-handed single  female admitted after stroke and left her with expressive aphasia. Patient was evaluated at bedside and found AAOx3, NAD, afebrile. Patient refers has been doing a lot of therapy and has helped. Still with expressive aphasia but learning every day. No other deficits    ROS:  Denies fevers, chills, sweats. No chest pain, palpitations, lightheadedness. Denies coughing, wheezing or shortness of breath. Denies abdominal pain, nausea, diarrhea or constipation. No new areas of joint pain. Denies new areas of numbness or weakness. Denies new anxiety or depression issues. No new skin problems. Rehabilitation:   Progressing in therapies. PT:  Restrictions/Precautions: Fall Risk  Implants present? : (Pt denies)  Other position/activity restrictions: up as tolerated   Transfers  Sit to Stand: Stand by assistance(no AD)  Stand to sit: Stand by assistance(no AD)  Bed to Chair: Stand by assistance(RW)  Stand Pivot Transfers: Stand by assistance(RW)  Comment: Transfers completed without AD today. Ambulation 1  Surface: level tile  Device: Rolling Walker  Assistance: Stand by assistance  Quality of Gait: NBOS, but no scissoring observed; pt better able to maintain straight path with RW. Slow juliana, but more normal step length than without AD. Gait Deviations: Slow Juliana  Distance: 300'  Comments: narrow LILY , decreased left weight shift and hip flexion swing phase     Transfers  Sit to Stand: Stand by assistance(no AD)  Stand to sit: Stand by assistance(no AD)  Bed to Chair: Stand by assistance(RW)  Stand Pivot Transfers: Stand by assistance(RW)  Comment: Transfers completed without AD today.    Ambulation  Ambulation?: Yes  More Ambulation?: Yes  Ambulation 1  Surface: level tile  Device: Rolling Walker  Assistance: Stand by assistance  Quality of Gait: NBOS, but no scissoring observed; pt better able to maintain straight path with RW. Slow kelly, but more normal step length than without AD. Gait Deviations: Slow Kelly  Distance: 300'  Comments: narrow LILY , decreased left weight shift and hip flexion swing phase     Surface: level tile  Ambulation 1  Surface: level tile  Device: Rolling Walker  Assistance: Stand by assistance  Quality of Gait: NBOS, but no scissoring observed; pt better able to maintain straight path with RW. Slow kelly, but more normal step length than without AD. Gait Deviations: Slow Kelly  Distance: 300'  Comments: narrow LILY , decreased left weight shift and hip flexion swing phase     OT:  ADL  Feeding: Independent  Grooming: Stand by assistance(hand washing at sink)  UE Bathing: Setup, Verbal cueing  LE Bathing: Stand by assistance(seated EOB, donning TEDs and slip on shoes)  UE Dressing: Setup, Verbal cueing(Pt doff/baltazar shirt)  LE Dressing: Setup, Verbal cueing(Pt doff/baltazar pants, doff shorts, baltazar sneakers w set up . Mod assist to baltaazr knee hi brad hose (OT initiates it  part way then pt pulls them up))  Toileting: Stand by assistance  Additional Comments: Pt reports already completing additional grooming tasks this date, defers bathing and dressing. Pt reports son will be brining her more clothes later today. Instrumental ADL's  Instrumental ADLs: Yes     Balance  Sitting Balance: Independent  Standing Balance: Stand by assistance   Standing Balance  Time: AM: ~1 min x 2   Activity: AM: toileting, functional mobility in room.    Comment: 0-1 UE support intermittently, required cues/assist to follow pattern ~80% of the time, no LOB, Good balance   Functional Mobility  Functional - Mobility Device: No device  Activity: To/from bathroom  Assist Level: Stand by assistance  Functional Mobility Comments: impulsive tendencies, VC for safety and pace; no device used - throughout pt room      Bed mobility  Bridging: Stand by assistance  Rolling to Left: Supervision  Rolling to Right: Supervision  Supine to Sit: Supervision  Sit to Supine: Stand by assistance  Scooting: Supervision  Comment: supervision for safety only  Transfers  Stand Step Transfers: Stand by assistance  Sit to stand: Supervision  Stand to sit: Supervision  Transfer Comments: from EOB/ w/c   Toilet Transfers  Toilet - Technique: Ambulating  Equipment Used: Raised toilet seat without rails  Toilet Transfer: Stand by assistance  Toilet Transfers Comments: from EOB, VC for safe transfer with RW / no device      Shower Transfers  Shower - Transfer From: Wheelchair(W/c in bathroom then pt walks CGA into/out of shower.)  Shower - Transfer Type: To and From  Shower - Transfer To: Shower seat with back  Shower - Technique: Ambulating  Shower Transfers: Contact Guard       SPEECH: (from 5/19/2020 note)  Objective/Assessment:  Auditory Comprehension:   Yes/no consistent throughout session     Verbal Expression:    Responsive naming (open ended, presented verbally 36% accuracy lacho   Sentence completion 100% with choice of 3, presented visually     Pt continues to use telegraphic speech during picture description task. Sentence elaboration provided to improve syntax. Pt creating simple SVO sentences 22% accuracy lacho  Pt spontaneously produced the sentences: \"he's gonna lose the money\" and \"the phone is out of order\" during picture description task.      Reading Comprehension: Pt. Observed reading single words during sentence completion task.      Written Language: n/a     Other: Pt continues to benefit from phonemic cues/carrier phrases    Objective:  /86   Pulse 96   Temp 98 °F (36.7 °C) (Oral)   Resp 18   Ht 5' 6\" (1.676 m)   Wt 171 lb (77.6 kg)   SpO2 97%   BMI 27.60 kg/m²       GEN: well developed, well nourished, NAD  HEENT: NCAT, PERRL, EOMI, mucous membranes pink and moist  CV: RRR, no murmurs  PULM: CTA x 2  ABD: soft, NT, ND, BS+ and equal  NEURO: A&O x3. Sensation intact to light touch. DTRs + 2  MSK: Functional ROM full in all 4 extremities . Strength is 5/5 on LUE and LLE. Patient is 4+/5 in RUE and RLE proximally and distally. Dupuytren's contractures on bilateral hands in 3-5th fingers  EXTREMITIES: No calf tenderness to palpation bilaterally. No edema BLEs  SKIN: warm dry and intact with good turgor  PSYCH: appropriately interactive. Affect WNL. Diagnostics:     CBC:   Recent Labs     05/20/20  0622   WBC 4.4   RBC 4.61   HGB 14.2   HCT 42.4   MCV 92.0   RDW 13.0        BMP:   Recent Labs     05/20/20  0622      K 3.4*      CO2 22   BUN 30*   CREATININE 0.86   GLUCOSE 101*     BNP: No results for input(s): BNP in the last 72 hours. PT/INR: No results for input(s): PROTIME, INR in the last 72 hours. APTT: No results for input(s): APTT in the last 72 hours. CARDIAC ENZYMES: No results for input(s): CKMB, CKMBINDEX, TROPONINT in the last 72 hours. Invalid input(s): CKTOTAL;3  FASTING LIPID PANEL:  Lab Results   Component Value Date    CHOL 237 (H) 05/12/2020     05/12/2020    TRIG 82 05/12/2020     LIVER PROFILE: No results for input(s): AST, ALT, ALB, BILIDIR, BILITOT, ALKPHOS in the last 72 hours.      Current Medications:   Current Facility-Administered Medications: senna (SENOKOT) tablet 17.2 mg, 2 tablet, Oral, Nightly PRN  magnesium hydroxide (MILK OF MAGNESIA) 400 MG/5ML suspension 15 mL, 15 mL, Oral, Daily PRN  potassium chloride (KLOR-CON M) extended release tablet 40 mEq, 40 mEq, Oral, PRN **OR** potassium bicarb-citric acid (EFFER-K) effervescent tablet 40 mEq, 40 mEq, Oral, PRN **OR** potassium chloride 10 mEq/100 mL IVPB (Peripheral Line), 10 mEq, Intravenous, PRN  clopidogrel (PLAVIX) tablet 75 mg, 75 mg, Oral, Daily  enoxaparin (LOVENOX) injection 40 mg, 40 mg, Subcutaneous, Daily  aspirin EC tablet 81 mg, 81 mg, Oral, Daily  atorvastatin (LIPITOR) tablet 80 mg, 80 mg, Oral, Nightly  polyethylene glycol (GLYCOLAX) packet 17 g, 17 g, Oral,

## 2020-05-20 NOTE — PROGRESS NOTES
ground). Slow juliana, but more normal step length than without AD. Gait Deviations: Slow Juliana  Distance: 300' x2 a.m. Ambulation 2  Surface - 2: level tile  Device 2: No device  Assistance 2: Stand by assistance;Contact guard assistance(Progressed quickly to SBA)  Quality of Gait 2: Uneven step length, some crossing midline with turns/distraction. NBOS. No LOB. Pt demo's better ability to maintain straight path. Distance: 300' x2 p.m. Comments: Standing rest breaks PRN, with emphasis on reading and sounding out words on signs. Stairs  # Steps : 17  Stairs Height: 8\"  Rails: Left ascending(Pt started with B hand rails and then progressed to R rail)  Assistance: Stand by assistance;Supervision  Comment: Step-to pattern, no difficulty observed. BALANCE Posture: Good  Sitting - Static: Good(EOB, no back or UE support)  Sitting - Dynamic: Good(EOB, no back or UE support)  Standing - Static: Good;-(No AD)  Standing - Dynamic: Fair;+(RW)    EXERCISES    Other exercises?: Yes  Other exercises 2: Standing B LE ther ex, AROM, 15x each  Other exercises 3: Tall and short negar F step-over, 7x2 over 20' + 4 additional(Pt circumducts L LE; demo's adequate hip/knee flexion with L LE leading for short hurdles, req's UE support to top taller hurdles without catching foot. )  Other exercises 8: NU-step L4 x 12 min    Activity Tolerance: Patient Tolerated treatment well     PT Equipment Recommendations  Other: TBD(Pt would prefer RW over SBQC due to hands)    Current Treatment Recommendations: Strengthening, Gait Training, Patient/Caregiver Education & Training, Equipment Evaluation, Education, & procurement, Stair training, Balance Training, Functional Mobility Training, Endurance Training, Transfer Training, Safety Education & Training, Home Exercise Program    Conditions Requiring Skilled Therapeutic Intervention  Body structures, Functions, Activity limitations: Decreased functional mobility ; Decreased strength;Decreased balance;Decreased safe awareness;Decreased endurance  Assessment: Pt demo's good, safe use of both SBQC & RW today. Barriers to Learning: expressive aphasia, safety awareness  REQUIRES PT FOLLOW UP: Yes  Discharge Recommendations: Home with assist PRN;Patient would benefit from continued therapy after discharge    Goals  Short term goals  Time Frame for Short term goals: 5 days  Short term goal 1: Pt to demonstrate IND bed mobility. Short term goal 2: Pt to demonstrate supervision transfers. Short term goal 3: Pt to amb 175' SBA to CGA with device prn. Short term goal 4: Pt to ascend/descend 6 stairs per home set up SBA. Short term goal 5: Pt to improve sitting balance to GOOD. Short term goal 6: pt to demonstrate gait 50-80' w/ CGA x 1 using least restrictive device  Short term goal 7: pt to demonstrate fair + or better dynamic ambulatory balance  using least restrictive device  Short term goal 8: pt to demonstrate ability to ascend/ descend 4-6\" platform step   using least restrictive device w/ min x 1  Long term goals  Time Frame for Long term goals : by D/C  Long term goal 1: Pt to demonstrate Mod I transfers from varied surfaces. Long term goal 2: Pt to amb at least 200' on varied terrain Mod I.  Long term goal 3: Pt to ascend/descend 1 flight of stairs supervision. Long term goal 4: Pt to improve 2MWT to at least 175' Mod I.  Long term goal 5: Pt to improve Tinetti score to at least 25/28 to reduce fall risk at home and in community. Long term goal 6: Pt to improve standing balance to GOOD-.  Long term goal 7: Pt to improve PASS score to 30 to improve overall safe functional mobility.      05/20/20 0737 05/20/20 1238   PT Individual Minutes   Time In [id number]   Time Out 0831 1303   Minutes 60 25     Electronically signed by Abner Hernandez PTA on 5/20/20 at 4:05 PM EDT

## 2020-05-21 PROCEDURE — 97530 THERAPEUTIC ACTIVITIES: CPT

## 2020-05-21 PROCEDURE — 1180000000 HC REHAB R&B

## 2020-05-21 PROCEDURE — 97110 THERAPEUTIC EXERCISES: CPT

## 2020-05-21 PROCEDURE — 6370000000 HC RX 637 (ALT 250 FOR IP): Performed by: INTERNAL MEDICINE

## 2020-05-21 PROCEDURE — 92507 TX SP LANG VOICE COMM INDIV: CPT

## 2020-05-21 PROCEDURE — 6370000000 HC RX 637 (ALT 250 FOR IP): Performed by: PHYSICAL MEDICINE & REHABILITATION

## 2020-05-21 PROCEDURE — 6360000002 HC RX W HCPCS: Performed by: INTERNAL MEDICINE

## 2020-05-21 PROCEDURE — 99231 SBSQ HOSP IP/OBS SF/LOW 25: CPT | Performed by: INTERNAL MEDICINE

## 2020-05-21 PROCEDURE — 97116 GAIT TRAINING THERAPY: CPT

## 2020-05-21 PROCEDURE — 99231 SBSQ HOSP IP/OBS SF/LOW 25: CPT | Performed by: PHYSICAL MEDICINE & REHABILITATION

## 2020-05-21 PROCEDURE — 97535 SELF CARE MNGMENT TRAINING: CPT

## 2020-05-21 RX ADMIN — POTASSIUM CHLORIDE 20 MEQ: 1500 TABLET, EXTENDED RELEASE ORAL at 08:47

## 2020-05-21 RX ADMIN — POLYETHYLENE GLYCOL 3350 17 G: 17 POWDER, FOR SOLUTION ORAL at 08:47

## 2020-05-21 RX ADMIN — CLOPIDOGREL BISULFATE 75 MG: 75 TABLET ORAL at 08:46

## 2020-05-21 RX ADMIN — ATORVASTATIN CALCIUM 80 MG: 80 TABLET, FILM COATED ORAL at 19:55

## 2020-05-21 RX ADMIN — ASPIRIN 81 MG: 81 TABLET, COATED ORAL at 08:46

## 2020-05-21 RX ADMIN — ENOXAPARIN SODIUM 40 MG: 40 INJECTION SUBCUTANEOUS at 08:46

## 2020-05-21 NOTE — PROGRESS NOTES
assistance(with, without RW (close SBA without))  Comment: Transfers completed without AD today. Ambulation  Ambulation?: Yes  More Ambulation?: Yes  Ambulation 1  Surface: level tile  Device: Rolling Walker  Assistance: Stand by assistance  Quality of Gait: NBOS, but no scissoring observed; pt better able to maintain straight path & control of RW (did not  off ground). Slow juliana, but more normal step length than without AD. Gait Deviations: Slow Juliana  Distance: 300' x2 a.m. Comments: narrow LILY , decreased left weight shift and hip flexion swing phase     Surface: level tile  Ambulation 1  Surface: level tile  Device: Rolling Walker  Assistance: Stand by assistance  Quality of Gait: NBOS, but no scissoring observed; pt better able to maintain straight path & control of RW (did not  off ground). Slow juliana, but more normal step length than without AD. Gait Deviations: Slow Juliana  Distance: 300' x2 a.m. Comments: narrow LILY , decreased left weight shift and hip flexion swing phase     OT:  ADL  Feeding: Independent  Grooming: Stand by assistance(hand hygiene after toileting)  UE Bathing: None  LE Bathing: None  UE Dressing: None  LE Dressing: (able to don socks sitting EOB (for short walk to bathroom)then sits EOB to doff footies, don TEDs and shoes)  Toileting: Stand by assistance(able to perform BM hygiene and clothing management)  Additional Comments: Defers most ADLs, reports having to use toilet. Agreeable to TEDs/shoes. Instrumental ADL's  Instrumental ADLs: Yes     Balance  Sitting Balance: Independent  Standing Balance: Stand by assistance   Standing Balance  Time: AM: ~1 min x 2   Activity: AM: toileting, functional mobility in room.    Comment: 0-1 UE support intermittently, required cues/assist to follow pattern ~80% of the time, no LOB, Good balance   Functional Mobility  Functional - Mobility Device: No device  Activity: To/from bathroom  Assist Level: Contact guard assistance  Functional Mobility Comments: impulsive tendencies, VC for safety and pace; no device used - throughout pt room      Bed mobility  Bridging: Stand by assistance  Rolling to Left: Supervision  Rolling to Right: Supervision  Supine to Sit: Modified independent  Sit to Supine: Modified independent  Scooting: Modified independent  Comment: supervision for safety only  Transfers  Stand Step Transfers: Stand by assistance  Sit to stand: Supervision  Stand to sit: Supervision  Transfer Comments: from EOB/ w/c   Toilet Transfers  Toilet - Technique: Ambulating(from EOB; no device)  Equipment Used: Raised toilet seat without rails  Toilet Transfer: Stand by assistance  Toilet Transfers Comments: from EOB, VC for safe transfer with RW / no device      Shower Transfers  Shower - Transfer From: Wheelchair(W/c in bathroom then pt walks CGA into/out of shower.)  Shower - Transfer Type: To and From  Shower - Transfer To: Shower seat with back  Shower - Technique: Ambulating  Shower Transfers: Contact Guard       SPEECH: (from 2020 note)  Auditory Comprehension:   Written directions presented verbally/visually (1 step, 2 component) using spatial terms, 57% accuracy lacho, no increase with max cues provided.      Verbal Expression:   Finding pictures corresponding to target letter (/s/): 90% accuracy lacho   Confrontation naming with verbal primin% accuracy lacho     Naming 3 members of concrete category (phonemic paraphasias throughout) 75% accuracy (no time constraint). Pt visibly frustrated throughout task.      Reading Comprehension:   Pt reading single words throughout generative naming task, 100% accuracy.  Pt encouraged to read aloud in room.      Written Language: n/a    Objective:  /87   Pulse 90   Temp 97.4 °F (36.3 °C) (Oral)   Resp 20   Ht 5' 6\" (1.676 m)   Wt 171 lb (77.6 kg)   SpO2 97%   BMI 27.60 kg/m²       GEN: well developed, well nourished, NAD  HEENT: NCAT, PERRL, EOMI, mucous membranes pink and moist  CV: RRR, no murmurs  PULM: CTA x 2  ABD: soft, NT, ND, BS+ and equal  NEURO: A&O x3. Sensation intact to light touch. DTRs + 2  MSK: Functional ROM full in all 4 extremities . Strength is 5/5 on LUE and LLE. Patient is 4+/5 in RUE and RLE proximally and distally. Dupuytren's contractures on bilateral hands in 3-5th fingers  EXTREMITIES: No calf tenderness to palpation bilaterally. No edema BLEs  SKIN: warm dry and intact with good turgor  PSYCH: appropriately interactive. Affect WNL. Diagnostics:     CBC:   Recent Labs     05/20/20  0622   WBC 4.4   RBC 4.61   HGB 14.2   HCT 42.4   MCV 92.0   RDW 13.0        BMP:   Recent Labs     05/20/20  0622      K 3.4*      CO2 22   BUN 30*   CREATININE 0.86   GLUCOSE 101*     BNP: No results for input(s): BNP in the last 72 hours. PT/INR: No results for input(s): PROTIME, INR in the last 72 hours. APTT: No results for input(s): APTT in the last 72 hours. CARDIAC ENZYMES: No results for input(s): CKMB, CKMBINDEX, TROPONINT in the last 72 hours. Invalid input(s): CKTOTAL;3  FASTING LIPID PANEL:  Lab Results   Component Value Date    CHOL 237 (H) 05/12/2020     05/12/2020    TRIG 82 05/12/2020     LIVER PROFILE: No results for input(s): AST, ALT, ALB, BILIDIR, BILITOT, ALKPHOS in the last 72 hours.      Current Medications:   Current Facility-Administered Medications: potassium chloride (KLOR-CON M) extended release tablet 20 mEq, 20 mEq, Oral, Daily with breakfast  acetaminophen (TYLENOL) tablet 650 mg, 650 mg, Oral, Q6H PRN  senna (SENOKOT) tablet 17.2 mg, 2 tablet, Oral, Nightly PRN  magnesium hydroxide (MILK OF MAGNESIA) 400 MG/5ML suspension 15 mL, 15 mL, Oral, Daily PRN  potassium chloride (KLOR-CON M) extended release tablet 40 mEq, 40 mEq, Oral, PRN **OR** potassium bicarb-citric acid (EFFER-K) effervescent tablet 40 mEq, 40 mEq, Oral, PRN **OR** potassium chloride 10 mEq/100 mL IVPB (Peripheral Line), 10 mEq,

## 2020-05-21 NOTE — PROGRESS NOTES
Speech Language Pathology  Speech Language Pathology  [facility]    Speech Language Treatment Note    Date: 5/21/2020  Patients Name: Ayse Oliver  MRN: [id number]  Diagnosis: aphasia   Patient Active Problem List   Diagnosis Code    Acute cerebrovascular accident (CVA) ([facility] [address] I63.9    Mixed hyperlipidemia E78.2    Broca's aphasia R47.01    Acute CVA (cerebrovascular accident) ([facility] [address] I63.9       Pain: 0/10    Speech and Language Treatment  Treatment time:  7513-0930    Subjective: [x] Alert [x] Cooperative     [] Confused     [] Agitated    [] Lethargic      Objective/Assessment:  Auditory Comprehension:   Yes/no questions-presented in written form  Simple: 90% reading to self  Comparative (complex y/n): reading to self-40% lacho                                                Presented orally- 60% lacho    Verbal Expression:   Naming 3 members of concrete category (phonemic paraphasias throughout) 50% accuracy (no time constraint). Pt visibly frustrated throughout task. Reading Comprehension:   Pt reading single words throughout generative naming task, 100% accuracy. Pt encouraged to read aloud in room. Written Language: n/a    Other:   Plan:  [x] Continue ST services    [] Discharge from ST:      Discharge recommendations: [] Inpatient Rehab   [] [place]   [] Outpatient Therapy  [] Follow up at trauma clinic   [] Other:       Treatment completed by: Nely DAHL A.CCC/SLP

## 2020-05-21 NOTE — PROGRESS NOTES
[address]   ACUTE REHABILITATION OCCUPATIONAL THERAPY  DAILY NOTE    Date: 20  Patient Name: Eric Hendricks      Room: 9213/9191-95    MRN: [id number]   : 1940  (78 y.o.)  Gender: female   Referring Practitioner: Sadia Jenkins MD  Diagnosis: Acute CVA   Additional Pertinent Hx: 57-year-old female admitted with difficulty speaking. .  CT noted asymmetric hypoattenuation left parietal temporal lobe likely representing acute ischemia otherwise unremarkable. Outside TPA window. CTA head and neck completed unremarkable. Pt admitted to [facility] 20. MRI Brain 20: Acute infarct within the left parietal lobe with an additional acute  microinfarct within the contralateral parietal lobe. Restrictions  Restrictions/Precautions: Fall Risk  Implants present? : (Pt denies)  Other position/activity restrictions: up as tolerated  Required Braces or Orthoses?: No    Subjective  Comments: Pt pleasant and cooperative. Reports ADLs performed earlier today, declines ADL tasks this date   Patient Currently in Pain: Denies  Restrictions/Precautions: Fall Risk  Overall Orientation Status: Within Functional Limits  Patient Observation  Observations: Expressive aphasia, Dupuytren's contracture (L 4/5 digits, R 2/4 digits), fast moving/impulsive at times. Pt is very motivated to participate in therapy. Throught tx, pt requires multiple tactile cues to fully understand how to perform safely and correctly. Pain Assessment  Pain Assessment: 0-10    Objective     Balance  Sitting Balance: Independent  Standing Balance: Supervision  Bed mobility  Sit to Supine: Modified independent  Transfers  Sit to stand: Supervision  Stand to sit: Supervision  Standing Balance  Activity: dynamic task picking up bean bags from floor and various heights to address standing balance/tolerance for completing functional tasks. Comment: initial minor LOB however none further noted.    Functional Mobility  Functional - Mobility Device: No device  Activity: To/from bathroom  Assist Level: Stand by assistance  Functional Mobility Comments: impulsive tendencies, VC for safety and pace; no device used - throughout pt room   Toilet Transfers  Toilet - Technique: Ambulating  Equipment Used: Raised toilet seat without rails  Toilet Transfer: Stand by assistance;Supervision     Type of ROM/Therapeutic Exercise  Type of ROM/Therapeutic Exercise: Free weights(2#)  Comment: Pt engaged in BUE ex's to address overall strengthening and endurance for functional tasks. Verbal and tactile cueing required for proper technique with fair carryover. Rest breaks provided throughout. x20 reps. Exercises  Scapular Protraction: x  Scapular Retraction: x  Shoulder Depression: x  Shoulder Elevation: x  Shoulder Flexion: x  Shoulder Extension: x  Horizontal ABduction: x  Horizontal ADduction: x  Elbow Flexion: x(3#)  Elbow Extension: x(3#)  Other: red theratube to focus on hand/wrist strengthening; \"U\", upside U, twisting. Additional Activities: Other  Additional Activities: Functional task sequencing cards to address proper step orientation and recognition of functional activity. x3 sets of 6 cards completed. Pt c 1/6 correct on initial attempt with first set (making a pizza), able to correct c cues to identify errors. Pt c 6/6 correct on second set; cueing to assist in recognition of task only (setting a table). Pt c 5/6 correct on third set; cues for recognition of actvity at hand and identify error (making a pb & j sandwich). Sequencing small colored pegs following initial set up from writer to address followthrough of sequential order and B hand [address] skills. ADL  Feeding: Independent  Grooming: Supervision(hand hygiene following toileting)  Toileting: Supervision(toileting tasks performed safely )          Assessment  Performance deficits / Impairments: Decreased functional mobility ; Decreased

## 2020-05-21 NOTE — PROGRESS NOTES
4: static and dynamic bal activities; dyno disc 3x1 min each with BLE. and ball tap in // with SLS. x5 each. Other exercises 8: NU-step L4 x 10 min  Other exercises 13: Educated pt to remember to check and make w/c brakes are locked before STS are performed, fair carryover  Other exercises 14: 2mwt: 5' x1 with two 90 degree turns. CGA/SBA for safety. Other exercises 15: Supine ex's with 2# ankle weights. REps:2x10 each. Other Activities  Comment: rest breaks PRN        Activity Tolerance: Patient Tolerated treatment well  Activity Tolerance: safety concerns- pt impulsive  PT Equipment Recommendations  Equipment Needed: (TBD)  Other: TBD(Pt would prefer RW over SBQC due to hands)  Other Comments  Comments: pt requires > 1 instruction 25% of tretment for follow thru    Patient Education  New Education Provided:    Learner:patient  Method: demonstration and explanation       Outcome: needs reinforcement     Current Treatment Recommendations: Strengthening, Gait Training, Patient/Caregiver Education & Training, Equipment Evaluation, Education, & procurement, Stair training, Balance Training, Functional Mobility Training, Endurance Training, Transfer Training, Safety Education & Training, Home Exercise Program    Conditions Requiring Skilled Therapeutic Intervention  Body structures, Functions, Activity limitations: Decreased functional mobility ; Decreased strength;Decreased balance;Decreased safe awareness;Decreased endurance  Assessment: Pt demo's good, safe use of both SBQC & RW today. Treatment Diagnosis: Impaired functional mobility 2* Acute CVA  Prognosis: Excellent;Good  Decision Making: Medium Complexity  History: 78-year-old female admitted with difficulty speaking. .  CT noted asymmetric hypoattenuation left parietal temporal lobe likely representing acute ischemia otherwise unremarkable. Outside TPA window. CTA head and neck completed unremarkable. Pt admitted to [facility] 5/14/20.    Exam: ROM, MMT, bed mobility, transfers, amb, balance, stairs, activity tolerance  Clinical Presentation: Pt alert, pleasant, motivated for therapy. Barriers to Learning: expressive aphasia, safety awareness  REQUIRES PT FOLLOW UP: Yes  Discharge Recommendations: Home with assist PRN;Patient would benefit from continued therapy after discharge    Goals  Short term goals  Time Frame for Short term goals: 5 days  Short term goal 1: Pt to demonstrate IND bed mobility. Short term goal 2: Pt to demonstrate supervision transfers. Short term goal 3: Pt to amb 175' SBA to CGA with device prn. Short term goal 4: Pt to ascend/descend 6 stairs per home set up SBA. Short term goal 5: Pt to improve sitting balance to GOOD. Short term goal 6: pt to demonstrate gait 50-80' w/ CGA x 1 using least restrictive device  Short term goal 7: pt to demonstrate fair + or better dynamic ambulatory balance  using least restrictive device  Short term goal 8: pt to demonstrate ability to ascend/ descend 4-6\" platform step   using least restrictive device w/ min x 1  Long term goals  Time Frame for Long term goals : by D/C  Long term goal 1: Pt to demonstrate Mod I transfers from varied surfaces. Long term goal 2: Pt to amb at least 200' on varied terrain Mod I.  Long term goal 3: Pt to ascend/descend 1 flight of stairs supervision. Long term goal 4: Pt to improve 2MWT to at least 175' Mod I.  Long term goal 5: Pt to improve Tinetti score to at least 25/28 to reduce fall risk at home and in community. Long term goal 6: Pt to improve standing balance to GOOD-.  Long term goal 7: Pt to improve PASS score to 30 to improve overall safe functional mobility.        05/21/20 0746 05/21/20 1215   PT Individual Minutes   Time In [id number]   Time Out 0831 1300   Minutes 39 45       Electronically signed by Lalo Owusu PTA on 5/21/20 at 12:56 PM EDT

## 2020-05-22 PROCEDURE — 97110 THERAPEUTIC EXERCISES: CPT

## 2020-05-22 PROCEDURE — 97535 SELF CARE MNGMENT TRAINING: CPT

## 2020-05-22 PROCEDURE — 97116 GAIT TRAINING THERAPY: CPT

## 2020-05-22 PROCEDURE — 6370000000 HC RX 637 (ALT 250 FOR IP): Performed by: INTERNAL MEDICINE

## 2020-05-22 PROCEDURE — 97530 THERAPEUTIC ACTIVITIES: CPT

## 2020-05-22 PROCEDURE — 92507 TX SP LANG VOICE COMM INDIV: CPT

## 2020-05-22 PROCEDURE — 97112 NEUROMUSCULAR REEDUCATION: CPT

## 2020-05-22 PROCEDURE — 1180000000 HC REHAB R&B

## 2020-05-22 PROCEDURE — 6370000000 HC RX 637 (ALT 250 FOR IP): Performed by: PHYSICAL MEDICINE & REHABILITATION

## 2020-05-22 PROCEDURE — 99231 SBSQ HOSP IP/OBS SF/LOW 25: CPT | Performed by: PHYSICAL MEDICINE & REHABILITATION

## 2020-05-22 PROCEDURE — 99231 SBSQ HOSP IP/OBS SF/LOW 25: CPT | Performed by: INTERNAL MEDICINE

## 2020-05-22 PROCEDURE — 6360000002 HC RX W HCPCS: Performed by: INTERNAL MEDICINE

## 2020-05-22 RX ORDER — ASPIRIN 81 MG/1
81 TABLET ORAL DAILY
Qty: 30 TABLET | Refills: 3 | Status: SHIPPED | OUTPATIENT
Start: 2020-05-23

## 2020-05-22 RX ORDER — ATORVASTATIN CALCIUM 80 MG/1
80 TABLET, FILM COATED ORAL NIGHTLY
Qty: 30 TABLET | Refills: 3 | Status: SHIPPED | OUTPATIENT
Start: 2020-05-22

## 2020-05-22 RX ORDER — CLOPIDOGREL BISULFATE 75 MG/1
75 TABLET ORAL DAILY
Qty: 30 TABLET | Refills: 3 | Status: SHIPPED | OUTPATIENT
Start: 2020-05-23

## 2020-05-22 RX ADMIN — ATORVASTATIN CALCIUM 80 MG: 80 TABLET, FILM COATED ORAL at 22:20

## 2020-05-22 RX ADMIN — CLOPIDOGREL BISULFATE 75 MG: 75 TABLET ORAL at 08:31

## 2020-05-22 RX ADMIN — POLYETHYLENE GLYCOL 3350 17 G: 17 POWDER, FOR SOLUTION ORAL at 08:32

## 2020-05-22 RX ADMIN — ASPIRIN 81 MG: 81 TABLET, COATED ORAL at 08:31

## 2020-05-22 RX ADMIN — ENOXAPARIN SODIUM 40 MG: 40 INJECTION SUBCUTANEOUS at 08:31

## 2020-05-22 RX ADMIN — POTASSIUM CHLORIDE 20 MEQ: 1500 TABLET, EXTENDED RELEASE ORAL at 08:31

## 2020-05-22 ASSESSMENT — PAIN SCALES - GENERAL
PAINLEVEL_OUTOF10: 5
PAINLEVEL_OUTOF10: 1

## 2020-05-22 ASSESSMENT — PAIN DESCRIPTION - PAIN TYPE: TYPE: CHRONIC PAIN

## 2020-05-22 ASSESSMENT — PAIN DESCRIPTION - ORIENTATION: ORIENTATION: LEFT

## 2020-05-22 ASSESSMENT — PAIN DESCRIPTION - DESCRIPTORS: DESCRIPTORS: SORE;ACHING

## 2020-05-22 ASSESSMENT — PAIN DESCRIPTION - LOCATION: LOCATION: KNEE

## 2020-05-22 NOTE — PROGRESS NOTES
chills, sweats, fatigue, weight loss  HEENT:  negative for vision, hearing changes, runny nose, throat pain  RESPIRATORY:  negative for shortness of breath, cough, congestion, wheezing. CARDIOVASCULAR:  negative for chest pain, palpitations. GASTROINTESTINAL:  negative for nausea, vomiting, diarrhea, constipation, change in bowel habits, abdominal pain   GENITOURINARY:  negative for difficulty of urination, burning with urination, frequency   INTEGUMENT:  negative for rash, skin lesions, easy bruising   HEMATOLOGIC/LYMPHATIC:  negative for swelling/edema   ALLERGIC/IMMUNOLOGIC:  negative for urticaria , itching  ENDOCRINE:  negative increase in drinking, increase in urination, hot or cold intolerance  MUSCULOSKELETAL:  negative joint pains, muscle aches, swelling of joints  NEUROLOGICAL:  negative for headaches, dizziness, lightheadedness, numbness, pain, tingling extremities. Expressive aphasia present      Physical Exam:     /69   Pulse 94   Temp 98.3 °F (36.8 °C) (Oral)   Resp 14   Ht 5' 6\" (1.676 m)   Wt 171 lb (77.6 kg)   SpO2 98%   BMI 27.60 kg/m²   Temp (24hrs), Av.3 °F (36.8 °C), Min:98.2 °F (36.8 °C), Max:98.3 °F (36.8 °C)    No results for input(s): POCGLU in the last 72 hours. No intake or output data in the 24 hours ending 20    General Appearance:  alert, well appearing, and in no acute distress  Head:  normocephalic, atraumatic. Eye: no icterus, redness, pupils equal and reactive, extraocular eye movements intact, conjunctiva clear  Ear: normal external ear, no discharge, hearing intact  Nose:  no drainage noted  Mouth: mucous membranes moist  Neck: supple, no carotid bruits, thyroid not palpable  Lungs: Bilateral equal air entry, clear to ausculation, no wheezing, rales or rhonchi, normal effort  Cardiovascular: normal rate, regular rhythm, no murmur, gallop, rub.   Abdomen: Soft, nontender, nondistended, normal bowel sounds, no hepatomegaly or splenomegaly  Neurologic: Expressive aphasia present,  there are no new focal motor or sensory deficits, normal muscle tone and bulk, no abnormal sensation, Skin: No gross lesions, rashes, bruising or bleeding on exposed skin area  Extremities:  peripheral pulses palpable, no pedal edema or calf pain with palpation  Psych:normal affect    Investigations:      Laboratory Testing:  No results found for this or any previous visit (from the past 24 hour(s)). Imaging/Diagonstics:  Recent data reviewed    Assessment :      Primary Problem  <principal problem not specified>    Active Hospital Problems    Diagnosis Date Noted    Acute CVA (cerebrovascular accident) ([facility] [address] [I63.9] 05/14/2020    Mixed hyperlipidemia [E78.2] 05/11/2020       Plan:     1. Acute ischemic stroke of left parietal region, expressive aphasia- undergoing speech and language therapy, continue with aspirin Plavix and Lipitor  2. VTE prophylaxis-Lovenox  3. Hypokalemia- replaced while inpatient, recheck-labs ordered. 4. Acute kidney injury improved . 5.  hypokalemia replace sliding scale oral  6. DNR CC ARREST  7. 5/20   Patient is doing much better  Started patient on potassium 20 mg daily with persistent hypokalemia  5/22  Patient doing much better  No new complaints. Consultations:   [address] CONSULT TO SOCIAL WORK  IP CONSULT TO RECREATION THERAPY  IP CONSULT TO INTERNAL MEDICINE      Abdifatah Mark MD  5/22/2020  6:14 PM    Copy sent to Dr. Zeynep Jay primary care provider on file. Please note that this chart was generated using voice recognition Dragon dictation software. Although every effort was made to ensure the accuracy of this automated transcription, some errors in transcription may have occurred.

## 2020-05-22 NOTE — PROGRESS NOTES
[place] [address] Internal Medicine    CONSULTATION / HISTORY AND PHYSICAL EXAMINATION            Date:   5/21/2020  Patient name:  Linda Torrez  Date of admission:  5/14/2020  4:36 PM  MRN:   [id number]  Account:  [de-identified]  YOB: 1940  PCP:    No primary care provider on file. Room:   [address] [place] [address]  Code Status:    DNR-CCA    Physician Requesting Consult: Omar Cordova MD    Reason for Consult:  Medical management,     Chief Complaint:     No chief complaint on file. Acute ischemic stroke    History Obtained From:     Patient, EMR, nursing staff    History of Present Illness:     Initially admitted on 5/10 for expressive aphasia, noted to have acute ischemic stroke, no TPA given. Started on aspirin, Plavix Lipitor  Admitted to ER you on 5/14 for strengthening exercises  5/19   patient seen lying in bed, appears very comfortable. Denies any pain or limb weakness. Expressive aphasia Improving     Past Medical History:     Past Medical History:   Diagnosis Date    Arthritis         Past Surgical History:     No past surgical history on file. Medications Prior to Admission:     Prior to Admission medications    Not on File        Allergies:     Patient has no known allergies. Social History:     Tobacco:    reports that she has never smoked. She has never used smokeless tobacco.  Alcohol:      reports previous alcohol use. Drug Use:  reports no history of drug use. Family History:     No family history on file. Review of Systems:     Positive and Negative as described in HPI. CONSTITUTIONAL:  negative for fevers, chills, sweats, fatigue, weight loss  HEENT:  negative for vision, hearing changes, runny nose, throat pain  RESPIRATORY:  negative for shortness of breath, cough, congestion, wheezing. CARDIOVASCULAR:  negative for chest pain, palpitations.   GASTROINTESTINAL:  negative for nausea, vomiting, diarrhea, constipation, change in bowel habits, abdominal pain   GENITOURINARY:  negative for difficulty of urination, burning with urination, frequency   INTEGUMENT:  negative for rash, skin lesions, easy bruising   HEMATOLOGIC/LYMPHATIC:  negative for swelling/edema   ALLERGIC/IMMUNOLOGIC:  negative for urticaria , itching  ENDOCRINE:  negative increase in drinking, increase in urination, hot or cold intolerance  MUSCULOSKELETAL:  negative joint pains, muscle aches, swelling of joints  NEUROLOGICAL:  negative for headaches, dizziness, lightheadedness, numbness, pain, tingling extremities. Expressive aphasia present      Physical Exam:     /60   Pulse 89   Temp 98.2 °F (36.8 °C) (Oral)   Resp 18   Ht 5' 6\" (1.676 m)   Wt 171 lb (77.6 kg)   SpO2 94%   BMI 27.60 kg/m²   Temp (24hrs), Av.8 °F (36.6 °C), Min:97.4 °F (36.3 °C), Max:98.2 °F (36.8 °C)    No results for input(s): POCGLU in the last 72 hours. No intake or output data in the 24 hours ending 20    General Appearance:  alert, well appearing, and in no acute distress  Head:  normocephalic, atraumatic. Eye: no icterus, redness, pupils equal and reactive, extraocular eye movements intact, conjunctiva clear  Ear: normal external ear, no discharge, hearing intact  Nose:  no drainage noted  Mouth: mucous membranes moist  Neck: supple, no carotid bruits, thyroid not palpable  Lungs: Bilateral equal air entry, clear to ausculation, no wheezing, rales or rhonchi, normal effort  Cardiovascular: normal rate, regular rhythm, no murmur, gallop, rub.   Abdomen: Soft, nontender, nondistended, normal bowel sounds, no hepatomegaly or splenomegaly  Neurologic: Expressive aphasia present,  there are no new focal motor or sensory deficits, normal muscle tone and bulk, no abnormal sensation, Skin: No gross lesions, rashes, bruising or bleeding on exposed skin area  Extremities:  peripheral pulses palpable, no pedal edema or calf pain with palpation  Psych:normal affect    Investigations:

## 2020-05-22 NOTE — PROGRESS NOTES
Daily  enoxaparin (LOVENOX) injection 40 mg, 40 mg, Subcutaneous, Daily  aspirin EC tablet 81 mg, 81 mg, Oral, Daily  atorvastatin (LIPITOR) tablet 80 mg, 80 mg, Oral, Nightly  polyethylene glycol (GLYCOLAX) packet 17 g, 17 g, Oral, Daily      Impression/Plan:   Impaired ADLs, gait, and mobility due to:      1. 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 Patient currently on Aspirin 81mg and Clopidogrel for 21 days (Last day is 6/1/2020) then Aspirin 81mg PO Daily as monotherapy. Patient also on Atorvastatin 80mg PO nightly. Will follow up with general neurology in 4-6 weeks. 2. MAGED: IM monitoring. Improvement   3. Glucose control by IM  4. Bowel management: Miralax daily, senokot, milk of Magnesia as needed  5. IM for medical management  6. DVT prophylaxis:  On Enoxaparin    Electronically signed by:    Reina Sanford MD [facility]  Adult General Neurology Resident PGY-3  5/22/2020 at 8:31 AM      This note is created with the assistance of a speech recognition program.  While intending to generate a document that actually reflects the content of the visit, the document can still have some errors including those of syntax and sound a like substitutions which may escape proof reading. In such instances, actual meaning can be extrapolated by contextual diversion.

## 2020-05-22 NOTE — PROGRESS NOTES
Modified independent  Sit to Supine: Modified independent  Scooting: Modified independent(hips to EOM)  Comment: Mat, wedge, 4 pillows (increased from 2 at pt request)    Transfers:  Sit to Stand: Stand by assistance(with, without RW)  Stand to sit: Stand by assistance(with, without RW)  Bed to Chair: Stand by assistance(with, without RW)  Stand pivot transfers: SBA (with, without RW)    Ambulation 1  Surface: level tile  Device: Rolling Walker  Assistance: Stand by assistance  Quality of Gait: NBOS, but no scissoring observed; pt better able to maintain straight path & control of RW (did not  off ground), but impulsively picks 1-2 hands off RW. Gait Deviations: Slow Kelly  Distance: 300' x2 a.m. & x1 p.m. Comments: L LE antalgic     Ambulation 2  Surface - 2: level tile  Device 2: No device  Assistance 2: Stand by assistance  Quality of Gait 2: NBOS, uneven step length & antalgic gait pattern. Gait Deviations: Slow Kelly;Decreased step length  Distance: 20' x2 & 60' p.m. Comments: Pt reports continued L knee pain, opting to use RW for most longer-distance amb. Stairs/Curb  Stairs?: Yes  Stairs  # Steps : 16  Stairs Height: 8\"  Rails: Left ascending  Device: No Device  Assistance: Stand by assistance  Comment: Step-to pattern, no difficulty observed. BALANCE Posture: Good  Sitting - Static: Good(EOB, no back or UE support)  Sitting - Dynamic: Good(EOB, no back or UE support)  Standing - Static: Good;-(No AD)  Standing - Dynamic: Fair;+(RW)    EXERCISES    Other exercises?: Yes  Other exercises 4: Static dynamic activities in // bars, 0-1 UE support, CGA PRN: level tile mod LILY EC, NBOS EO/EC; foam pad, NBOS EO/EC, tandem stance x2 EO. Other exercises 5: F/L/R/karaoke amb, 2x10' each, 0-1 UE support, // bars  Other exercises 8: NU-step L4 x 16 min  Other exercises 15: Supine B LE ther ex, 2#, 20x each or more to pt tolerance.      Activity Tolerance: Patient Tolerated treatment well(Rest

## 2020-05-22 NOTE — PROGRESS NOTES
tendencies throughout. Cues for pacing, safety and throughness in bathing tasks. Assessment  Performance deficits / Impairments: Decreased functional mobility ; Decreased endurance;Decreased strength;Decreased ADL status; Decreased safe awareness;Decreased balance;Decreased coordination  Prognosis: Good  Discharge Recommendations: Patient would benefit from continued therapy after discharge;Home with assist PRN  Activity Tolerance: Patient Tolerated treatment well  Safety Devices in place: Yes  Equipment Recommendations  Equipment Needed: (TBD)     Plan  Plan  Times per week: 1.5 hr/day, 5-7x/wk   Times per day: Twice a day  Current Treatment Recommendations: Strengthening, Functional Mobility Training, Patient/Caregiver Education & Training, Endurance Training, Equipment Evaluation, Education, & procurement, Balance Training, Safety Education & Training, Self-Care / ADL, Home Management Training, Cognitive/Perceptual Training  Patient Goals   Patient goals : to go home  Short term goals  Time Frame for Short term goals: in 5 days, patient will  Short term goal 1: perform functional transfer with supervision assistance for safety only using DME as appropriate  Short term goal 2: perform toileting routine with supervision assistance for safety only using DME as appropriate   Short term goal 3: increase standing tolerance to 10 minutes with 0-1 UE support during functional task  Short term goal 4: perform UB ADLs with set up / LB ADLs with supervision   Short term goal 5: actively participate in 30 minutes of therapeutic exercise/activity to promote increased safety and independence with self care and mobility tasks   Long term goals  Time Frame for Long term goals : by discharge, patient will  Long term goal 1: perform functional transfers/mobility during ADL routine with Modified [place] using DME as appropriate with Good safety  Long term goal 2: perform BADL tasks with independence/modified

## 2020-05-23 PROCEDURE — 6360000002 HC RX W HCPCS: Performed by: INTERNAL MEDICINE

## 2020-05-23 PROCEDURE — 6370000000 HC RX 637 (ALT 250 FOR IP): Performed by: INTERNAL MEDICINE

## 2020-05-23 PROCEDURE — 1180000000 HC REHAB R&B

## 2020-05-23 PROCEDURE — 97535 SELF CARE MNGMENT TRAINING: CPT

## 2020-05-23 PROCEDURE — 97112 NEUROMUSCULAR REEDUCATION: CPT

## 2020-05-23 PROCEDURE — 92507 TX SP LANG VOICE COMM INDIV: CPT

## 2020-05-23 PROCEDURE — 97530 THERAPEUTIC ACTIVITIES: CPT

## 2020-05-23 PROCEDURE — 99231 SBSQ HOSP IP/OBS SF/LOW 25: CPT | Performed by: INTERNAL MEDICINE

## 2020-05-23 PROCEDURE — 97110 THERAPEUTIC EXERCISES: CPT

## 2020-05-23 PROCEDURE — 97116 GAIT TRAINING THERAPY: CPT

## 2020-05-23 RX ADMIN — ATORVASTATIN CALCIUM 80 MG: 80 TABLET, FILM COATED ORAL at 20:00

## 2020-05-23 RX ADMIN — POTASSIUM CHLORIDE 20 MEQ: 1500 TABLET, EXTENDED RELEASE ORAL at 07:03

## 2020-05-23 RX ADMIN — ENOXAPARIN SODIUM 40 MG: 40 INJECTION SUBCUTANEOUS at 07:03

## 2020-05-23 RX ADMIN — CLOPIDOGREL BISULFATE 75 MG: 75 TABLET ORAL at 07:03

## 2020-05-23 RX ADMIN — ASPIRIN 81 MG: 81 TABLET, COATED ORAL at 07:03

## 2020-05-23 ASSESSMENT — PAIN SCALES - GENERAL
PAINLEVEL_OUTOF10: 0
PAINLEVEL_OUTOF10: 0

## 2020-05-23 NOTE — PROGRESS NOTES
[place] [address] Internal Medicine    CONSULTATION / HISTORY AND PHYSICAL EXAMINATION            Date:   5/23/2020  Patient name:  Esthela Marc  Date of admission:  5/14/2020  4:36 PM  MRN:   [id number]  Account:  [de-identified]  YOB: 1940  PCP:    No primary care provider on file. Room:   [address] [place] [address]  Code Status:    DNR-CCA    Physician Requesting Consult: Sari Ott MD    Reason for Consult:  Medical management,     Chief Complaint:     No chief complaint on file. Acute ischemic stroke    History Obtained From:     Patient, EMR, nursing staff    History of Present Illness:      5/23/2020    Patient tolerating rehabilitative therapies . Progressing fairly well . Continue present therapy . ·     Stable . · No new symptoms . 1. Initially admitted on 5/10 for expressive aphasia, noted to have acute ischemic stroke, no TPA given. Started on aspirin, Plavix Lipitor  Admitted to ER you on 5/14 for strengthening exercises  5/19   patient seen lying in bed, appears very comfortable. Denies any pain or limb weakness. Expressive aphasia Improving     Past Medical History:     Past Medical History:   Diagnosis Date    Arthritis         Past Surgical History:     No past surgical history on file. Medications Prior to Admission:     Prior to Admission medications    Medication Sig Start Date End Date Taking? Authorizing Provider   aspirin 81 MG EC tablet Take 1 tablet by mouth daily 5/23/20  Yes Alix Carrion MD   atorvastatin (LIPITOR) 80 MG tablet Take 1 tablet by mouth nightly 5/22/20  Yes Alix Carrion MD   clopidogrel (PLAVIX) 75 MG tablet Take 1 tablet by mouth daily 5/23/20  Yes Alix Carrion MD        Allergies:     Patient has no known allergies. Social History:     Tobacco:    reports that she has never smoked. She has never used smokeless tobacco.  Alcohol:      reports previous alcohol use.   Drug Use:  reports no history of drug ausculation, no wheezing, rales or rhonchi, normal effort  Cardiovascular: normal rate, regular rhythm, no murmur, gallop, rub. Abdomen: Soft, nontender, nondistended, normal bowel sounds, no hepatomegaly or splenomegaly  Neurologic: Expressive aphasia present,  there are no new focal motor or sensory deficits, normal muscle tone and bulk, no abnormal sensation, Skin: No gross lesions, rashes, bruising or bleeding on exposed skin area  Extremities:  peripheral pulses palpable, no pedal edema or calf pain with palpation  Psych:normal affect    Investigations:      Laboratory Testing:  No results found for this or any previous visit (from the past 24 hour(s)). Imaging/Diagonstics:  Recent data reviewed    Assessment :      Primary Problem  <principal problem not specified>    Active Hospital Problems    Diagnosis Date Noted    Acute CVA (cerebrovascular accident) ([facility] [address] [I63.9] 05/14/2020    Mixed hyperlipidemia [E78.2] 05/11/2020       Plan:     1. Acute ischemic stroke of left parietal region, expressive aphasia- undergoing speech and language therapy, continue with aspirin Plavix and Lipitor  2. VTE prophylaxis-Lovenox  3. Hypokalemia- replaced while inpatient, recheck-labs ordered. 4. Acute kidney injury improved . 5.  hypokalemia replace sliding scale oral  6. DNR CC ARREST  7. 5/20   Patient is doing much better  Started patient on potassium 20 mg daily with persistent hypokalemia  5/22  Patient doing much better  No new complaints. Consultations:   Crissy Owusu CONSULT TO RECREATION THERAPY  IP CONSULT TO INTERNAL MEDICINE      Demarcus Ball MD  5/23/2020  12:49 PM    Copy sent to Dr. Zainab Tipton primary care provider on file. Please note that this chart was generated using voice recognition Dragon dictation software. Although every effort was made to ensure the accuracy of this automated transcription, some errors in transcription may have occurred.

## 2020-05-23 NOTE — PROGRESS NOTES
[address] [facility]    ACUTE REHABILITATION OCCUPATIONAL THERAPY  DAILY NOTE    Date: 20  Patient Name: Alverda Koyanagi      Room: 7930/4940-27    MRN: [id number]   : 1940  (78 y.o.)  Gender: female   Referring Practitioner: Urmila Vazquez MD  Diagnosis: Acute CVA   Additional Pertinent Hx: 40-year-old female admitted with difficulty speaking. .  CT noted asymmetric hypoattenuation left parietal temporal lobe likely representing acute ischemia otherwise unremarkable. Outside TPA window. CTA head and neck completed unremarkable. Pt admitted to [facility] 20. MRI Brain 20: Acute infarct within the left parietal lobe with an additional acute  microinfarct within the contralateral parietal lobe. Restrictions  Restrictions/Precautions: Fall Risk  Implants present? : (pt denies)  Other position/activity restrictions: up as tolerated  Required Braces or Orthoses?: No    Subjective  Subjective: AM/PM: Pt has no new concerns to report this date. Difficulty word-finding. Writer provided yes/no questions. Comments: Pt pleasant and cooperative. Agreeable to participate in OT treatment session. Patient Currently in Pain: Denies  Pain Level: 0  Restrictions/Precautions: Fall Risk  Overall Orientation Status: Within Functional Limits     Pain Assessment  Pain Assessment: 0-10  Pain Level: 0    Objective  Cognition  Overall Cognitive Status: Impaired  Following Directions:  Follows two step commands  Attention Span: Appears intact  Memory: Decreased short term memory  Safety Judgement: Decreased awareness of need for safety  Insights: Decreased awareness of deficits  Sequencing and Organization: Assistance required to identify errors made  Cognition  Cognition Comment: AM/PM: expressive aphasia, quick movements, can be impulsive   Balance  Sitting Balance: Supervision  Standing Balance: Stand by assistance  Bed mobility  Supine to Sit: Supervision  Sit to Supine: Supervision  Scooting: coordination  Assessment: Pt progressing well towards OT goals. Pt has demonstrated increased functional activity tolerance and improved standing balance. Pt demonstrated increased performance and safety with ADL and dynamic standing tasks this date. Prognosis: Good  Discharge Recommendations: Patient would benefit from continued therapy after discharge;Home with assist PRN  Activity Tolerance: Patient Tolerated treatment well  Type of devices: Call light within reach;Gait belt;Bed alarm in place; Patient at risk for falls; Left in bed(telesitter in place)  Restraints  Initially in place: Yes  Restraints: Bed alarm  Equipment Recommendations  Equipment Needed: (TBD)       Patient Education:  Patient Goals   Patient goals : to go home  Learner:patient  Method: demonstration and explanation       Outcome: needs reinforcement   OT Education  OT Education: OT Role;Energy Conservation;Plan of Care;Precautions;Transfer Training  Patient Education: activity pacing, environemental scanning, good return  Barriers to Learning: expressive aphasia    Plan  Plan  Times per week: 1.5 hr/day, 5-7x/wk   Times per day: Twice a day  Current Treatment Recommendations: Strengthening, Functional Mobility Training, Patient/Caregiver Education & Training, Endurance Training, Equipment Evaluation, Education, & procurement, Balance Training, Safety Education & Training, Self-Care / ADL, Home Management Training, Cognitive/Perceptual Training  Patient Goals   Patient goals : to go home  Short term goals  Time Frame for Short term goals: in 5 days, patient will  Short term goal 1: perform functional transfer with supervision assistance for safety only using DME as appropriate  Short term goal 2: perform toileting routine with supervision assistance for safety only using DME as appropriate   Short term goal 3: increase standing tolerance to 10 minutes with 0-1 UE support during functional task  Short term goal 4: perform UB ADLs with set up /

## 2020-05-23 NOTE — PROGRESS NOTES
strengthening and endurance for functional tasks. Verbal and tactile cueing required for proper technique with fair carryover. Rest breaks provided throughout. x20 reps. Exercises  Scapular Protraction: x  Scapular Retraction: x  Shoulder Flexion: x  Shoulder Extension: x  Horizontal ABduction: x  Horizontal ADduction: x  Elbow Flexion: x  Elbow Extension: x  Additional Activities: Other  Additional Activities: Ring/dowl threading activity to address B hand [address] and spatial organization skills. Pt tolerated task well c cueing for initial understanding. Pt engaged in card sequencing activity to promote [address], sequencing, visual scanning and dynamic ROM skills. Pt required mod verbal cues and increased time for appropriate continuous card matching location   ADL  Feeding: Independent  Grooming: Supervision(pt reports completing earlier this date. )  UE Bathing: Setup;Stand by assistance;Verbal cueing  LE Bathing: Stand by assistance;Setup;Verbal cueing  UE Dressing: Stand by assistance;Supervision  LE Dressing: Stand by assistance;Verbal cueing(ALDA Harris)  Toileting: None  Additional Comments: pt completes shower this date, impulsive tendencies throughout. Cues for pacing, safety and throughness in bathing tasks.              SPEECH: (from 5/21/2020 note)  Auditory Comprehension:   Yes/no questions-presented in written form  Simple: 90% reading to self  Comparative (complex y/n): reading to self-40% lacho                                                Presented orally- 60% lacho     Verbal Expression:   Naming 3 members of concrete category (phonemic paraphasias throughout) 50% accuracy (no time constraint). Pt visibly frustrated throughout task.      Reading Comprehension:   Pt reading single words throughout generative naming task, 100% accuracy.  Pt encouraged to read aloud in room.      Written Language: n/a    Objective:  /73   Pulse 122 Comment: peak, with activity  Temp 98.2 °F (36.8 °C) (Oral)   Resp 16 tablet 40 mEq, 40 mEq, Oral, PRN **OR** potassium bicarb-citric acid (EFFER-K) effervescent tablet 40 mEq, 40 mEq, Oral, PRN **OR** potassium chloride 10 mEq/100 mL IVPB (Peripheral Line), 10 mEq, Intravenous, PRN  clopidogrel (PLAVIX) tablet 75 mg, 75 mg, Oral, Daily  enoxaparin (LOVENOX) injection 40 mg, 40 mg, Subcutaneous, Daily  aspirin EC tablet 81 mg, 81 mg, Oral, Daily  atorvastatin (LIPITOR) tablet 80 mg, 80 mg, Oral, Nightly  polyethylene glycol (GLYCOLAX) packet 17 g, 17 g, Oral, Daily      Impression/Plan:   Impaired ADLs, gait, and mobility due to:      1. 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 Patient currently on Aspirin 81mg and Clopidogrel for 21 days (Last day is 6/1/2020) then Aspirin 81mg PO Daily as monotherapy. Patient also on Atorvastatin 80mg PO nightly. Will follow up with general neurology in 4-6 weeks. 2. MAGED: IM monitoring. Improvement   3. Glucose control by IM  4. Bowel management: Miralax daily, senokot, milk of Magnesia as needed  5. IM for medical management  6. DVT prophylaxis:  On Enoxaparin    Electronically signed by:    Ramirez Hastings MD    This note is created with the assistance of a speech recognition program.  While intending to generate a document that actually reflects the content of the visit, the document can still have some errors including those of syntax and sound a like substitutions which may escape proof reading. In such instances, actual meaning can be extrapolated by contextual diversion.

## 2020-05-23 NOTE — PROGRESS NOTES
using least restrictive device  Short term goal 7: pt to demonstrate fair + or better dynamic ambulatory balance  using least restrictive device  Short term goal 8: pt to demonstrate ability to ascend/ descend 4-6\" platform step   using least restrictive device w/ min x 1  Long term goals  Time Frame for Long term goals : by D/C  Long term goal 1: Pt to demonstrate Mod I transfers from varied surfaces. Long term goal 2: Pt to amb at least 200' on varied terrain Mod I.  Long term goal 3: Pt to ascend/descend 1 flight of stairs supervision. Long term goal 4: Pt to improve 2MWT to at least 175' Mod I.  Long term goal 5: Pt to improve Tinetti score to at least 25/28 to reduce fall risk at home and in community. Long term goal 6: Pt to improve standing balance to GOOD-.  Long term goal 7: Pt to improve PASS score to 30 to improve overall safe functional mobility.      05/23/20 0739 05/23/20 1445   PT Individual Minutes   Time In 0730 1420   Time Out 0830 1454   Minutes 60 34     Electronically signed by Harsha Monge PTA on 5/23/20 at 3:15 PM EDT

## 2020-05-24 PROCEDURE — 97112 NEUROMUSCULAR REEDUCATION: CPT

## 2020-05-24 PROCEDURE — 6370000000 HC RX 637 (ALT 250 FOR IP): Performed by: INTERNAL MEDICINE

## 2020-05-24 PROCEDURE — 6370000000 HC RX 637 (ALT 250 FOR IP): Performed by: PHYSICAL MEDICINE & REHABILITATION

## 2020-05-24 PROCEDURE — 6360000002 HC RX W HCPCS: Performed by: INTERNAL MEDICINE

## 2020-05-24 PROCEDURE — 99231 SBSQ HOSP IP/OBS SF/LOW 25: CPT | Performed by: INTERNAL MEDICINE

## 2020-05-24 PROCEDURE — 97535 SELF CARE MNGMENT TRAINING: CPT

## 2020-05-24 PROCEDURE — 97530 THERAPEUTIC ACTIVITIES: CPT

## 2020-05-24 PROCEDURE — 97116 GAIT TRAINING THERAPY: CPT

## 2020-05-24 PROCEDURE — 1180000000 HC REHAB R&B

## 2020-05-24 PROCEDURE — 97110 THERAPEUTIC EXERCISES: CPT

## 2020-05-24 RX ADMIN — ATORVASTATIN CALCIUM 80 MG: 80 TABLET, FILM COATED ORAL at 20:05

## 2020-05-24 RX ADMIN — CLOPIDOGREL BISULFATE 75 MG: 75 TABLET ORAL at 07:04

## 2020-05-24 RX ADMIN — ASPIRIN 81 MG: 81 TABLET, COATED ORAL at 07:04

## 2020-05-24 RX ADMIN — POTASSIUM CHLORIDE 20 MEQ: 1500 TABLET, EXTENDED RELEASE ORAL at 07:04

## 2020-05-24 RX ADMIN — POLYETHYLENE GLYCOL 3350 17 G: 17 POWDER, FOR SOLUTION ORAL at 07:04

## 2020-05-24 RX ADMIN — ENOXAPARIN SODIUM 40 MG: 40 INJECTION SUBCUTANEOUS at 07:04

## 2020-05-24 ASSESSMENT — PAIN SCALES - GENERAL
PAINLEVEL_OUTOF10: 0
PAINLEVEL_OUTOF10: 0

## 2020-05-24 NOTE — PROGRESS NOTES
use.    Family History:     No family history on file. Review of Systems:     Positive and Negative as described in HPI. CONSTITUTIONAL:  negative for fevers, chills, sweats, fatigue, weight loss  HEENT:  negative for vision, hearing changes, runny nose, throat pain  RESPIRATORY:  negative for shortness of breath, cough, congestion, wheezing. CARDIOVASCULAR:  negative for chest pain, palpitations. GASTROINTESTINAL:  negative for nausea, vomiting, diarrhea, constipation, change in bowel habits, abdominal pain   GENITOURINARY:  negative for difficulty of urination, burning with urination, frequency   INTEGUMENT:  negative for rash, skin lesions, easy bruising   HEMATOLOGIC/LYMPHATIC:  negative for swelling/edema   ALLERGIC/IMMUNOLOGIC:  negative for urticaria , itching  ENDOCRINE:  negative increase in drinking, increase in urination, hot or cold intolerance  MUSCULOSKELETAL:  negative joint pains, muscle aches, swelling of joints  NEUROLOGICAL:  negative for headaches, dizziness, lightheadedness, numbness, pain, tingling extremities. Expressive aphasia present      Physical Exam:     BP (!) 145/83   Pulse 89   Temp 97.7 °F (36.5 °C) (Oral)   Resp 16   Ht 5' 6\" (1.676 m)   Wt 171 lb (77.6 kg)   SpO2 95%   BMI 27.60 kg/m²   Temp (24hrs), Av °F (36.7 °C), Min:97.7 °F (36.5 °C), Max:98.2 °F (36.8 °C)    No results for input(s): POCGLU in the last 72 hours. No intake or output data in the 24 hours ending 20 1226    General Appearance:  alert, well appearing, and in no acute distress  Head:  normocephalic, atraumatic.   Eye: no icterus, redness, pupils equal and reactive, extraocular eye movements intact, conjunctiva clear  Ear: normal external ear, no discharge, hearing intact  Nose:  no drainage noted  Mouth: mucous membranes moist  Neck: supple, no carotid bruits, thyroid not palpable  Lungs: Bilateral equal air entry, clear to ausculation, no wheezing, rales or rhonchi, normal effort  Cardiovascular: normal rate, regular rhythm, no murmur, gallop, rub. Abdomen: Soft, nontender, nondistended, normal bowel sounds, no hepatomegaly or splenomegaly  Neurologic: Expressive aphasia present,  there are no new focal motor or sensory deficits, normal muscle tone and bulk, no abnormal sensation, Skin: No gross lesions, rashes, bruising or bleeding on exposed skin area  Extremities:  peripheral pulses palpable, no pedal edema or calf pain with palpation  Psych:normal affect    Investigations:      Laboratory Testing:  No results found for this or any previous visit (from the past 24 hour(s)). Imaging/Diagonstics:  Recent data reviewed    Assessment :      Primary Problem  <principal problem not specified>    Active Hospital Problems    Diagnosis Date Noted    Acute CVA (cerebrovascular accident) ([facility] [address] [I63.9] 05/14/2020    Mixed hyperlipidemia [E78.2] 05/11/2020       Plan:     1. Acute ischemic stroke of left parietal region, expressive aphasia- undergoing speech and language therapy, continue with aspirin Plavix and Lipitor  2. VTE prophylaxis-Lovenox  3. Hypokalemia- replaced while inpatient, recheck-labs ordered. 4. Acute kidney injury improved . 5.  hypokalemia replace sliding scale oral  6. DNR CC ARREST  7. 5/20   Patient is doing much better  Started patient on potassium 20 mg daily with persistent hypokalemia  5/22  Patient doing much better  No new complaints. Consultations:   Kamran Blackwell TO RECREATION THERAPY  IP CONSULT TO INTERNAL MEDICINE      Padmini Berry MD  5/24/2020  12:26 PM    Copy sent to Dr. Mortensen [place] primary care provider on file. Please note that this chart was generated using voice recognition Dragon dictation software. Although every effort was made to ensure the accuracy of this automated transcription, some errors in transcription may have occurred.

## 2020-05-24 NOTE — PROGRESS NOTES
Physical Therapy  [facility]: SHAYNA VALENTINE  Acute Rehabilitation Physical Therapy Progress Note    Date: 20  Patient Name: Jose Davis       Room: 5297/6861-00  MRN: [id number]   Account: [de-identified]   : 1940  (78 y.o.) Gender: female     Referring Practitioner: Nguyen Brown MD  Diagnosis: Acute CVA   Past Medical History:  has a past medical history of Arthritis. Past Surgical History:   has no past surgical history on file. Additional Pertinent Hx: 41-year-old female admitted with difficulty speaking. .  CT noted asymmetric hypoattenuation left parietal temporal lobe likely representing acute ischemia otherwise unremarkable. Outside TPA window. CTA head and neck completed unremarkable. Pt admitted to [facility] 20. Overall Orientation Status: Within Normal Limits(Y/N BD, place, situation & person questions correct)  Restrictions/Precautions  Restrictions/Precautions: Fall Risk  Required Braces or Orthoses?: No  Implants present? : (pt denies)  Position Activity Restriction  Other position/activity restrictions: up as tolerated    Subjective: Pt states she slept & ate well. Pt request to use restroom upon arrival. Pt is agreeable to PT. Comments: Writer assisted pt in donning compression stockings. Vital Signs  BP Location: Right Arm  Level of Consciousness: Alert  Patient Currently in Pain: Denies  Response to Pain Intervention: None;Patient Satisfied     Oxygen Therapy  O2 Device: None (Room air)  Patient Observation  Observations: Expressive aphasia, Dupuytren's contracture (L 4/5 digits, R 2/4 digits), fast moving/impulsive at times. Pt is very motivated to participate in therapy. Throught tx, pt requires multiple tactile cues to fully understand how to perform safely and correctly.        Bed Mobility:   Bed Mobility  Bridging: Stand by assistance  Rolling: Supervision;Rolling Left;Rolling Right  Supine to Sit: Modified independent  Sit to Supine: Modified independent  Scooting: Supervision  Comment: Pt slightly impulsive and demos quick movements. Transfers:  Sit to Stand: Stand by assistance;Supervision(with, without RW)  Stand to sit: Stand by assistance;Supervision(with, without RW)    Pt impulsively stands/pushes RW to side to continue short distances without AD. Ambulation 1  Surface: level tile;ramp  Device: Rolling Walker  Assistance: Supervision  Quality of Gait: NBOS, but no scissoring observed; pt better able to maintain straight path & control of RW (did not  off ground), but impulsively picks 1-2 hands off RW. Demos quick turns. vc's for safety awareness. Gait Deviations: Slow Kelly  Distance: 300'x2  Comments: L LE antalgic; no LOB. Ambulation 2  Surface - 2: level tile  Device 2: No device  Assistance 2: Stand by assistance  Quality of Gait 2: NBOS, uneven step length & antalgic gait pattern. Unsteady with dynamic SLS balance activity. Increased crossing of midline/scissoring with distraction. Gait Deviations: Slow Kelly;Decreased step length  Distance: 20-50' distances in PT gym. Comments: Pt reports continued L knee pain, opting to use RW for most longer-distance amb. Ambulation 3  Surface - 3: level tile  Device 3: No device  Assistance 3: Contact guard assistance  Quality of Gait 3: NBOS/occasional scissoring/staggering, particularly if distracted/turning head. Demo's good posture. encouraging looking in different directions, slightly unsteady. Gait Deviations: Decreased step length;Deviated path  Distance: 200ft; shorter distances within room & PT gym  Comments: Safety concerns due to slight (R) neglect and being impulsive  Stairs/Curb  Stairs?: Yes  Stairs  # Steps : 17  Stairs Height: 8\"  Rails: Left ascending  Device: No Device  Assistance: Supervision  Comment: Step-to pattern, no difficulty observed.                                                   BALANCE Posture: Good  Sitting - Static: Good(EOB, no female admitted with difficulty speaking. .  CT noted asymmetric hypoattenuation left parietal temporal lobe likely representing acute ischemia otherwise unremarkable. Outside TPA window. CTA head and neck completed unremarkable. Pt admitted to [facility] 5/14/20. Exam: ROM, MMT, bed mobility, transfers, amb, balance, stairs, activity tolerance  Clinical Presentation: Pt alert, pleasant, motivated for therapy. Barriers to Learning: expressive aphasia, safety awareness  REQUIRES PT FOLLOW UP: Yes  Discharge Recommendations: Home with assist PRN;Patient would benefit from continued therapy after discharge    Goals  Short term goals  Time Frame for Short term goals: 5 days  Short term goal 1: Pt to demonstrate IND bed mobility. Short term goal 2: Pt to demonstrate supervision transfers. Short term goal 3: Pt to amb 175' SBA to CGA with device prn. Short term goal 4: Pt to ascend/descend 6 stairs per home set up SBA. Short term goal 5: Pt to improve sitting balance to GOOD. Short term goal 6: pt to demonstrate gait 50-80' w/ CGA x 1 using least restrictive device  Short term goal 7: pt to demonstrate fair + or better dynamic ambulatory balance  using least restrictive device  Short term goal 8: pt to demonstrate ability to ascend/ descend 4-6\" platform step   using least restrictive device w/ min x 1  Long term goals  Time Frame for Long term goals : by D/C  Long term goal 1: Pt to demonstrate Mod I transfers from varied surfaces. Long term goal 2: Pt to amb at least 200' on varied terrain Mod I.  Long term goal 3: Pt to ascend/descend 1 flight of stairs supervision. Long term goal 4: Pt to improve 2MWT to at least 175' Mod I.  Long term goal 5: Pt to improve Tinetti score to at least 25/28 to reduce fall risk at home and in community. Long term goal 6: Pt to improve standing balance to GOOD-.  Long term goal 7: Pt to improve PASS score to 30 to improve overall safe functional mobility.        05/24/20

## 2020-05-24 NOTE — PROGRESS NOTES
[address] [facility]    ACUTE REHABILITATION OCCUPATIONAL THERAPY  DAILY NOTE    Date: 20  Patient Name: Bartolome Fenton      Room: 0875/4198-21    MRN: [id number]   : 1940  (78 y.o.)  Gender: female   Referring Practitioner: Manish Hicks MD  Diagnosis: Acute CVA   Additional Pertinent Hx: 78-year-old female admitted with difficulty speaking. .  CT noted asymmetric hypoattenuation left parietal temporal lobe likely representing acute ischemia otherwise unremarkable. Outside TPA window. CTA head and neck completed unremarkable. Pt admitted to [facility] 20. MRI Brain 20: Acute infarct within the left parietal lobe with an additional acute  microinfarct within the contralateral parietal lobe. Restrictions  Restrictions/Precautions: Fall Risk  Implants present? : (pt denies)  Other position/activity restrictions: up as tolerated  Required Braces or Orthoses?: No    Subjective  Subjective: AM/PM: Pt has no new concerns to report this date. Able to identify 1 item that she ate for breakfast (eggs). Comments: Pt pleasant and cooperative. Agreeable to participate in OT treatment session. Patient Currently in Pain: Denies  Pain Level: 0  Restrictions/Precautions: Fall Risk  Overall Orientation Status: (Pt able to name hospital with options provided, able to state name at initial attempt. Required 1 v/c for year. Able to name president without verbal cueing)  Patient Observation  Observations: Expressive aphasia, Dupuytren's contracture (L 4/5 digits, R 2/4 digits), fast moving/impulsive at times. Pt is very motivated to participate in therapy. Throught tx, pt requires multiple tactile cues to fully understand how to perform safely and correctly.   Pain Assessment  Pain Assessment: 0-10  Pain Level: 0    Objective  Cognition  Overall Cognitive Status: Impaired  Safety Judgement: Decreased awareness of need for assistance  Insights: Decreased awareness of deficits  Sequencing and

## 2020-05-24 NOTE — PROGRESS NOTES
Stand by assistance;Contact guard assistance  Quality of Gait: NBOS, mild scissoring at times, pt better able to maintain straight path. Demos quick turns. vc's for safety awareness. Distance: 275', including 3 90 degree turns, no standing rest breaks, 175'  Comments: No LOB. Ambulation 2  Surface - 2: ramp  Device 2: No device  Assistance 2: Stand by assistance;Contact guard assistance  Quality of Gait 2: NBOS, Increased crossing of midline/scissoring with distraction, increased lateral postural sway  Gait Deviations: Slow Juliana  Distance: 100' ascend/descend, no UE support  Comments: SBA/CGA for safety, slowed juliana, no LOB  Ambulation 3  Surface - 3: level tile  Device 3: No device  Assistance 3: Contact guard assistance  Quality of Gait 3: NBOS/occasional scissoring/staggering with distractions, Demo's good posture. slightly unsteady, 1 mild LOB when looking toward R  Gait Deviations: Decreased step length;Deviated path  Distance: 48' with vertical head turns, 48' with lateral head turns  Comments: Safety concerns due to slight (R) neglect and being impulsive, encouraged scanning of environment and head turns for balance challenge. Balance  Posture: Good  Sitting - Static: Good(EOB, no back or UE support)  Sitting - Dynamic: Good(EOB, no back or UE support)  Standing - Static: Good(No AD)  Standing - Dynamic: Fair;+(RW)  Comments: seated EOB and standing without AD  Other exercises  Other exercises?: Yes  Other exercises 1: Weaving through cones x6  Other exercises 2: Picking up cones x6, CGA/SBA  Other exercises 3:  Forward/retro amb x20' CGA to min x1, no UE support  Other exercises 4: Side stepping x20' CGA, no UE support  Other exercises 5: Marching with amb with 1 UE support x20'  Other exercises 6: STS x5 13 seconds, additional 5 STS SBA/supervision for 10 STS total of 47 seconds  Other exercises 7: TUG 16 seconds, cueing needed  Other exercises 8: Cone tap x10 B LE standing 0 UE support, CGA  Other exercises 9: Standing on foam: narrow LILY/EO +EC, wide LILY/EO + EC 30 seconds each, CGA for safety  Other exercises 10: Weight ball activity x8 each LE 1 UE support and CGA to min x1 (cueing for color and L/R Leg) Pt having difficulty scanning environment, choosing correct color and correct LE to use, required repetition of cues  Other exercises 11: Uneven surface ambulation (ramp)         Comment: rest breaks PRN              G-Code     OutComes Score  Balance Score: 11 (05/15/20 0830)  Gait Score: 10 (05/15/20 0830)        Tinetti Total Score: 21 (05/15/20 0830)    Goals  Short term goals  Time Frame for Short term goals: 5 days  Short term goal 1: Pt to demonstrate IND bed mobility. Short term goal 2: Pt to demonstrate supervision transfers. Short term goal 3: Pt to amb 175' SBA to CGA with device prn. Short term goal 4: Pt to ascend/descend 6 stairs per home set up SBA. Short term goal 5: Pt to improve sitting balance to GOOD. Short term goal 6: pt to demonstrate gait 50-80' w/ CGA x 1 using least restrictive device  Short term goal 7: pt to demonstrate fair + or better dynamic ambulatory balance  using least restrictive device  Short term goal 8: pt to demonstrate ability to ascend/ descend 4-6\" platform step   using least restrictive device w/ min x 1  Long term goals  Time Frame for Long term goals : by D/C  Long term goal 1: Pt to demonstrate Mod I transfers from varied surfaces. Long term goal 2: Pt to amb at least 200' on varied terrain Mod I.  Long term goal 3: Pt to ascend/descend 1 flight of stairs supervision. Long term goal 4: Pt to improve 2MWT to at least 175' Mod I.  Long term goal 5: Pt to improve Tinetti score to at least 25/28 to reduce fall risk at home and in community. Long term goal 6: Pt to improve standing balance to GOOD-.  Long term goal 7: Pt to improve PASS score to 30 to improve overall safe functional mobility. Patient Goals   Patient goals :  \"To move

## 2020-05-25 PROCEDURE — 6370000000 HC RX 637 (ALT 250 FOR IP): Performed by: INTERNAL MEDICINE

## 2020-05-25 PROCEDURE — 6360000002 HC RX W HCPCS: Performed by: INTERNAL MEDICINE

## 2020-05-25 PROCEDURE — 92507 TX SP LANG VOICE COMM INDIV: CPT

## 2020-05-25 PROCEDURE — 97116 GAIT TRAINING THERAPY: CPT

## 2020-05-25 PROCEDURE — 1180000000 HC REHAB R&B

## 2020-05-25 PROCEDURE — 97530 THERAPEUTIC ACTIVITIES: CPT

## 2020-05-25 PROCEDURE — 97110 THERAPEUTIC EXERCISES: CPT

## 2020-05-25 PROCEDURE — 97535 SELF CARE MNGMENT TRAINING: CPT

## 2020-05-25 PROCEDURE — 97112 NEUROMUSCULAR REEDUCATION: CPT

## 2020-05-25 PROCEDURE — 99231 SBSQ HOSP IP/OBS SF/LOW 25: CPT | Performed by: INTERNAL MEDICINE

## 2020-05-25 RX ADMIN — POTASSIUM CHLORIDE 20 MEQ: 1500 TABLET, EXTENDED RELEASE ORAL at 07:28

## 2020-05-25 RX ADMIN — ENOXAPARIN SODIUM 40 MG: 40 INJECTION SUBCUTANEOUS at 07:28

## 2020-05-25 RX ADMIN — CLOPIDOGREL BISULFATE 75 MG: 75 TABLET ORAL at 07:28

## 2020-05-25 RX ADMIN — ASPIRIN 81 MG: 81 TABLET, COATED ORAL at 07:27

## 2020-05-25 RX ADMIN — ATORVASTATIN CALCIUM 80 MG: 80 TABLET, FILM COATED ORAL at 20:06

## 2020-05-25 ASSESSMENT — PAIN SCALES - GENERAL: PAINLEVEL_OUTOF10: 0

## 2020-05-25 NOTE — PROGRESS NOTES
[facility]    CONSULTATION / HISTORY AND PHYSICAL EXAMINATION            Date:   5/25/2020  Patient name:  Loris Snellen  Date of admission:  5/14/2020  4:36 PM  MRN:   [id number]  Account:  [de-identified]  YOB: 1940  PCP:    No primary care provider on file. Room:   [address] [place] [address]  Code Status:    DNR-CCA    Physician Requesting Consult: Edson Syed MD    Reason for Consult: Medical management    Chief Complaint:     No chief complaint on file. Acute ischemic stroke    History Obtained From:     patient, electronic medical record    History of Present Illness/ Interval History:   Patient tolerating rehab therapy no new symptoms overnight. Initially admitted 5/10 for expressive aphasia noted to have acute ischemic stroke, no TPA given patient on aspirin Plavix Lipitor. Past Medical History:     Past Medical History:   Diagnosis Date    Arthritis         Past Surgical History:     No past surgical history on file. Medications Prior to Admission:     Prior to Admission medications    Medication Sig Start Date End Date Taking? Authorizing Provider   aspirin 81 MG EC tablet Take 1 tablet by mouth daily 5/23/20  Yes Penny Hidalgo MD   atorvastatin (LIPITOR) 80 MG tablet Take 1 tablet by mouth nightly 5/22/20  Yes Penny Hidalgo MD   clopidogrel (PLAVIX) 75 MG tablet Take 1 tablet by mouth daily 5/23/20  Yes Penny Hidalgo MD        Allergies:     Patient has no known allergies. Social History:     Tobacco:    reports that she has never smoked. She has never used smokeless tobacco.  Alcohol:      reports previous alcohol use. Drug Use:  reports no history of drug use. Family History:     No family history on file. Review of Systems:     Positive and Negative as described in HPI. Constitutional: Negative for chills, fatigue, fever and unexpected weight change.    HENT: Negative for ear discharge, facial swelling, nosebleeds, sore throat, trouble swallowing and voice change. Eyes: Negative for redness and visual disturbance. Respiratory: Negative for cough, shortness of breath and wheezing. Cardiovascular: Negative for chest pain, palpitations and leg swelling. Gastrointestinal: Negative for abdominal pain, blood in stool, diarrhea and vomiting. Genitourinary: Negative for difficulty urinating, dysuria and flank pain. Musculoskeletal: Negative for joint swelling. Skin: Negative for color change and rash. Neurological: Negative for dizziness, seizures, syncope and headaches. Expressive aphasia present  Hematological: Negative for adenopathy. Does not bruise/bleed easily. Physical Exam:     /89   Pulse 97   Temp 98.1 °F (36.7 °C) (Oral)   Resp 20   Ht 5' 6\" (1.676 m)   Wt 171 lb (77.6 kg)   SpO2 97%   BMI 27.60 kg/m²   Temp (24hrs), Av.4 °F (36.9 °C), Min:98.1 °F (36.7 °C), Max:98.6 °F (37 °C)      General appearance:  alert, cooperative and no distress  Eyes: Anicteric sclera. Pupils are equally round and reactive to light. Extraocular movements are intact. Lungs:  clear to auscultation bilaterally, normal effort  Heart:  regular rate and rhythm, no murmur  Abdomen:  soft, nontender, nondistended, normal bowel sounds, no masses, hepatomegaly, splenomegaly  Extremities:  no edema, redness, tenderness in the calves  Skin:  no gross lesions, rashes, induration  Neuro:  Alert, oriented X 3, Gait normal. Non-focal.  Expressive aphasia present no new deficits.     Investigations:      Laboratory Testing:  Lab Results   Component Value Date    WBC 4.4 2020    HGB 14.2 2020    HCT 42.4 2020    MCV 92.0 2020     2020     Lab Results   Component Value Date     2020    K 3.4 2020     2020    CO2 22 2020    BUN 30 2020    CREATININE 0.86 2020    GLUCOSE 101 2020    CALCIUM 9.2 2020         Assessment :      Primary

## 2020-05-25 NOTE — PROGRESS NOTES
Physical Therapy  [facility]: SHAYNA VALENTINE  Acute Rehabilitation Physical Therapy Progress Note    Date: 20  Patient Name: Heriberto Monique       Room: 2185/9330-01  MRN: [id number]   Account: [de-identified]   : 1940  (78 y.o.) Gender: female     Referring Practitioner: Sera Bernardo MD  Diagnosis: Acute CVA   Past Medical History:  has a past medical history of Arthritis. Past Surgical History:   has no past surgical history on file. Additional Pertinent Hx: 49-year-old female admitted with difficulty speaking. .  CT noted asymmetric hypoattenuation left parietal temporal lobe likely representing acute ischemia otherwise unremarkable. Outside TPA window. CTA head and neck completed unremarkable. Pt admitted to [facility] 20. Overall Orientation Status: Within Normal Limits(Y/N BD, place, situation & person questions correct)  Restrictions/Precautions  Restrictions/Precautions: Fall Risk  Required Braces or Orthoses?: No  Implants present? : (pt denies)  Position Activity Restriction  Other position/activity restrictions: up as tolerated    Subjective: Pt denies new complaints, still experiencing L knee pain after longer periods of inactivity. Comments: Pt made Mod I in room with RW; dominique notified. Pt still demo's less than 50% comprehension of instructions with multiple details (\" the black cone with your R hand\"). Vital Signs  Patient Currently in Pain: Denies    Patient Observation  Observations: Expressive aphasia, Dupuytren's contracture (L 4/5 digits, R 2/4 digits), fast moving/impulsive at times.     Bed Mobility  Rolling: Rolling Left;Rolling Right;Supervision(Directional cues to complete rolling both ways. )  Supine to Sit: Modified independent  Sit to Supine: Modified independent  Scooting: Modified independent  Comment: Mat, 2 pillows    Transfers:  Sit to Stand: Stand by assistance;Supervision(Supervision w/RW; SBA without)  Stand to sit: Supervision;Stand When scoring the item, record the lowest response category that applies for each item. Maintaining a Posture  1. Sitting Without Support  Examiner: Have the subject sit on a bench/mat without support and with feet flat on the floor. 3 Can sit for 5 minutes without support  2. Standing with Support Examiner: Have the subject stand, providing support as needed. Evaluate only the ability to stand with or without support. Do not consider the quality of the stance. 3     Can stand with support of only 1 hand  3. Standing Without Support  Examiner:  Have the subject stand without support. Evaluate only the ability to stand with or without support. Do not consider the quality of the stance. 3  Can stand without support for more that 1 minute and simultaneously perform arm movements at about shoulder level  4. Standing on Non-paretic Leg  Examiner: Have the subject stand on the non-paretic leg. Evaluate only the ability to bear weight entirely on the non-paretic leg. Do not consider how the subject accomplishes the task. 1  Can stand on non-paretic leg for a few seconds  5. Standing on Paretic Leg  Examiner: Have the subject stand on the paretic leg. Evaluate only the ability to bear weight entirely on the paretic leg. Do not consider how the subject accomplishes the task. 0  Cannot stand of paretic leg     Maintaining Posture SUBTOTAL     10/15    Changing a Posture  6. Supine to Paretic Side Lateral  Examiner:  Begin with the subject in supine on a treatment mat. Instruct the subject to roll to the paretic side (lateral movement). Assist as necessary. Evaluated the subject's performance on the amount of help required. Do not consider the quality of performance. 3  Can perform without help  7. Supine to Non-paretic Side Lateral  Examiner:  Begin with the subject in supine on a treatment mat. Instruct the subject to roll to the non-paretic side (lateral movement). Assist as necessary. to Learning: expressive aphasia, safety awareness  REQUIRES PT FOLLOW UP: Yes  Discharge Recommendations: Home with assist PRN;Outpatient PT    Goals  Short term goals  Time Frame for Short term goals: 5 days  Short term goal 1: Pt to demonstrate IND bed mobility. Short term goal 2: Pt to demonstrate supervision transfers. Short term goal 3: Pt to amb 175' SBA to CGA with device prn. Short term goal 4: Pt to ascend/descend 6 stairs per home set up SBA. Short term goal 5: Pt to improve sitting balance to GOOD. Short term goal 6: pt to demonstrate gait 50-80' w/ CGA x 1 using least restrictive device  Short term goal 7: pt to demonstrate fair + or better dynamic ambulatory balance  using least restrictive device  Short term goal 8: pt to demonstrate ability to ascend/ descend 4-6\" platform step   using least restrictive device w/ min x 1  Long term goals  Time Frame for Long term goals : by D/C  Long term goal 1: Pt to demonstrate Mod I transfers from varied surfaces. Long term goal 2: Pt to amb at least 200' on varied terrain Mod I.  Long term goal 3: Pt to ascend/descend 1 flight of stairs supervision. Long term goal 4: Pt to improve 2MWT to at least 175' Mod I.  Long term goal 5: Pt to improve Tinetti score to at least 25/28 to reduce fall risk at home and in community. Long term goal 6: Pt to improve standing balance to GOOD-.  Long term goal 7: Pt to improve PASS score to 30 to improve overall safe functional mobility.      05/25/20 0738 05/25/20 1228   PT Individual Minutes   Time In 0684 1225   Time Out 0836 1301   Minutes 61 36     Electronically signed by Teresa Luke PTA on 5/25/20 at 3:31 PM EDT

## 2020-05-25 NOTE — PATIENT CARE CONFERENCE
[address]   ACUTE REHABILITATION  TEAM CONFERENCE NOTE  Date: 20  Patient Name: Telly Goodson       Room: 1780/0566-59  MRN: [id number]       : 1940  (78 y.o.)     Gender: female   Referring Practitioner: Ed Farias MD   Acute CVA (cerebrovascular accident) [facility]) [I63.9]  Diagnosis: Acute CVA      NURSING  Bladder  Continent  Bowel   Continent  Intervention    Both Bowel & Bladder Program     Wounds/Incisions/Ulcers: No skin issues identified  Medication Education Program: Patient able to manage medications and being educated by nursing  Pain: Patient's pain is currently controlled with -  tylenol as needed    Fall Risk:  Falling star program initiated    PHYSICAL THERAPY  Bed mobility  Rolling to Right: Modified independent  Supine to Sit: Modified independent  Sit to Supine: Modified independent  Scooting: Modified independent  Comment: HOB elevated, no cues needed. Pt back to bed with call light at end of session. Bed Mobility  Rolling: Rolling Left;Rolling Right;Supervision(Directional cues to complete rolling both ways. )  Supine to Sit: Modified independent  Sit to Supine: Modified independent  Scooting: Modified independent  Comment: Mat, 2 pillows    Transfers:  Sit to Stand: Stand by assistance;Supervision(Supervision w/RW; SBA without)  Stand to sit: Supervision;Stand by assistance(Supervision w/RW; SBA without)  Bed to Chair: Supervision;Stand by assistance(Supervision w/RW; SBA without)  Comment: rest breaks PRN    Ambulation 1  Surface: level tile  Device: No Device  Assistance: Stand by assistance;Contact guard assistance  Quality of Gait: NBOS, scissoring observed at turns, pt maintaining straight path, but sometimes needs VCs to mind upcoming obstacles. VCs for safety awareness. Delayed (and reduced) L hip flexion at swing initiation. Gait Deviations: Decreased step length  Distance: 300' x2 p.m. Comments: No LOB.    Ambulation 2  Surface - 2: level pt to demonstrate fair + or better dynamic ambulatory balance  using least restrictive device  Short term goal 8: pt to demonstrate ability to ascend/ descend 4-6\" platform step   using least restrictive device w/ min x 1    OCCUPATIONAL THERAPY  SELF CARE      Eating   6  Independent     Independent   Oral Hygiene   6  Assistance Needed: Independent      Modified independent (standing at sink for oral care, hand hygiene, hair care)   Shower/Bathe Self   4  Assistance Needed: Supervision or touching assistance      UE Bathing: Supervision(seated on TTB during shower)  LE Bathing: Supervision(seated on TTB during shower)   Upper Body Dressing   5  Assistance Needed: Setup or clean-up assistance       Setup(overhead shirt)   Lower Body Dressing   5  Assistance Needed: Setup or clean-up assistance     Putting On/Taking Off Footwear   5  Assistance Needed: Setup or clean-up assistance      Setup(pants, shoes) 1  Toilet Transfer   6  Assistance Needed: Independent       Toilet - Technique: Ambulating  Equipment Used: Grab bars  Toilet Transfer: Supervision  Toilet Transfers Comments: 1 v/c for grab bar use and slowed transition from stand to sit, fair return, needs reinforcement    Toileting Hygiene   6  Assistance Needed: Independent       Modified independent     Bed mobility  Rolling to Right: Supervision  Supine to Sit: Modified independent  Sit to Supine: Modified independent  Scooting: Modified independent  Comment: AM: supervision due to lack of insight into physical deficits. Demo'd appropriate technique, no verbal cueing provided for safety awareness this date.       Shower Transfers: Stand by [employer] Balance: Modified independent (seated on TTB)  Standing Balance: Supervision(supervision-mod I)  Standing Balance  Time: AM: ~18 minutes total, PM: ~12 minutes total   Activity: AM: functional mobility, ADLs  PM: functional mobility, standing during BUE exercises   Comment: Good

## 2020-05-25 NOTE — PROGRESS NOTES
functional mobility, standing during BUE exercises   Comment: Good balance   Functional Mobility  Functional - Mobility Device: No device  Activity: To/from bathroom; To/From therapy gym  Assist Level: Supervision(sup-Mod I)  Functional Mobility Comments: AM/PM within room, to/from OT gym; w/ RW and w/o RW (pt uses when she is more tired)  Shower Transfers  Shower - Transfer Type: To and From  Shower - Transfer To: Transfer tub bench  Shower - Technique: Ambulating  Shower Transfers: Stand by assistance;Supervision  Fine Motor: from seated position: connect 4 game w/ focus on FM, problem solving, higher level cognition   Type of ROM/Therapeutic Exercise  Type of ROM/Therapeutic Exercise: Cane/Wand(3# for elbows, 2# for shoulders )  Comment: PM: Pt completed BUE therex to increase strength and endurance to increase safety and independence to ADL/IADL tasks. Pt tolerated well, verbal cueing provided for technique. Completed exercises in standing. 2x20 elbows, 2x10 shoulders   Exercises  Scapular Protraction: x  Scapular Retraction: x  Shoulder Flexion: x  Shoulder Extension: x  Elbow Flexion: x  Elbow Extension: x  Other: forward circles          AM: seated position, participated in \"Go Fish\" using \"old Maid cards\"- focus on phrases \"do you have ____\" and \"Go Fish\" as well as naming various occupations on cards (, teacher, ect.) Req moderate cueing for names and proper game instruction throughout. ADL  Feeding: Independent  Grooming: Modified independent (standing at sink for oral care, hand hygiene, hair care)  UE Bathing: Supervision(seated on TTB during shower)  LE Bathing: Supervision(seated on TTB during shower)  UE Dressing: Setup(overhead shirt)  LE Dressing: Setup(pants, shoes)  Toileting: Modified independent   Additional Comments: Shower this date.  Patient with quick pace- completed all ADLs in about 18 minutes       OT scores     Eating  Assistance Needed: Independent  CARE Score: term goals: in 5 days, patient will  Short term goal 1: perform functional transfer with supervision assistance for safety only using DME as appropriate  Short term goal 2: perform toileting routine with supervision assistance for safety only using DME as appropriate   Short term goal 3: increase standing tolerance to 10 minutes with 0-1 UE support during functional task  Short term goal 4: perform UB ADLs with set up / LB ADLs with supervision   Short term goal 5: actively participate in 30 minutes of therapeutic exercise/activity to promote increased safety and independence with self care and mobility tasks   Long term goals  Time Frame for Long term goals : by discharge, patient will  Long term goal 1: perform functional transfers/mobility during ADL routine with Modified [place] using DME as appropriate with Good safety  Long term goal 2: perform BADL tasks with independence/modified independence using compensatory techniques/DME as appropriate with Good safety  Long term goal 3: perform IADL tasks such as laundry/simple meal prep with independence/modified independence using DME as appropriate with Good safety  Long term goal 4: V/D Good understanding of BUE HEP for maintaining strength/coordination for functional tasks  Long term goal 5: V/D Good understanding of DME for increased safety during ADL routine for safe d/c home   Timed Code Treatment Minutes: 55 Minutes     OT Individual Minutes  Time in 0836  Time out 0931  Minutes 55    Time in  1300  Time out 1330  Minutes 30    Electronically signed by Yennifer Hoskins OT on 5/25/20 at 3:49 PM EDT

## 2020-05-26 VITALS
RESPIRATION RATE: 20 BRPM | SYSTOLIC BLOOD PRESSURE: 134 MMHG | DIASTOLIC BLOOD PRESSURE: 71 MMHG | TEMPERATURE: 97.9 F | HEART RATE: 125 BPM | OXYGEN SATURATION: 98 % | HEIGHT: 66 IN | WEIGHT: 171 LBS | BODY MASS INDEX: 27.48 KG/M2

## 2020-05-26 PROCEDURE — 6370000000 HC RX 637 (ALT 250 FOR IP): Performed by: INTERNAL MEDICINE

## 2020-05-26 PROCEDURE — 97110 THERAPEUTIC EXERCISES: CPT

## 2020-05-26 PROCEDURE — 99238 HOSP IP/OBS DSCHRG MGMT 30/<: CPT | Performed by: PHYSICAL MEDICINE & REHABILITATION

## 2020-05-26 PROCEDURE — 97530 THERAPEUTIC ACTIVITIES: CPT

## 2020-05-26 PROCEDURE — 92507 TX SP LANG VOICE COMM INDIV: CPT

## 2020-05-26 PROCEDURE — 97116 GAIT TRAINING THERAPY: CPT

## 2020-05-26 PROCEDURE — 6360000002 HC RX W HCPCS: Performed by: INTERNAL MEDICINE

## 2020-05-26 RX ADMIN — ASPIRIN 81 MG: 81 TABLET, COATED ORAL at 08:45

## 2020-05-26 RX ADMIN — POTASSIUM CHLORIDE 20 MEQ: 1500 TABLET, EXTENDED RELEASE ORAL at 08:45

## 2020-05-26 RX ADMIN — CLOPIDOGREL BISULFATE 75 MG: 75 TABLET ORAL at 08:45

## 2020-05-26 RX ADMIN — ENOXAPARIN SODIUM 40 MG: 40 INJECTION SUBCUTANEOUS at 08:45

## 2020-05-26 ASSESSMENT — 9 HOLE PEG TEST
TESTTIME_SECONDS: 40
TESTTIME_SECONDS: 34
TEST_RESULT: IMPAIRED

## 2020-05-26 NOTE — PLAN OF CARE
Calls out in an appropriate manner when help is needed
Cont. To follow current care plans
Nutrition Problem: Inadequate oral intake  Intervention: Food and/or Nutrient Delivery: Continue current diet  Nutritional Goals: PO intake % of most meals
Problem: Falls - Risk of:  Goal: Will remain free from falls  Description: Will remain free from falls  5/22/2020 0408 by Gavin Costa RN  Outcome: Ongoing  Note: Patient remains free from falls/injuries at this time. Bed locked and in lowest position. Bed alarm activated. Call light within reach. Hourly nursing rounds made. Will continue to monitor. Problem: Falls - Risk of:  Goal: Absence of physical injury  Description: Absence of physical injury  5/22/2020 0408 by Gavin Costa RN  Outcome: Ongoing     Problem: Neurological  Goal: Maximum potential motor/sensory/cognitive function  5/22/2020 0408 by Gavin oCsta RN  Outcome: Ongoing     Problem: Pain:  Goal: Control of acute pain  Description: Control of acute pain  5/22/2020 0408 by Gavin Costa RN  Outcome: Ongoing  Note: Pain controlled with prn medications this shift. Will continue to monitor.       Problem: Nutrition  Goal: Optimal nutrition therapy  5/22/2020 0408 by Gavin Costa RN  Outcome: Ongoing     Problem: Physical Regulation:  Goal: Will remain free from infection  Description: Will remain free from infection  5/22/2020 0408 by Gavin Costa RN  Outcome: Ongoing
Problem: Falls - Risk of:  Goal: Will remain free from falls  Description: Will remain free from falls  5/22/2020 1547 by Adeola Carrasco RN  Outcome: Ongoing  5/22/2020 0408 by Brad Kevin RN  Outcome: Ongoing  Note: Patient remains free from falls/injuries at this time. Bed locked and in lowest position. Bed alarm activated. Call light within reach. Hourly nursing rounds made. Will continue to monitor. Goal: Absence of physical injury  Description: Absence of physical injury  5/22/2020 1547 by Adeola Carrasco RN  Outcome: Ongoing  5/22/2020 0408 by Brad Kevin RN  Outcome: Ongoing     Problem: Neurological  Goal: Maximum potential motor/sensory/cognitive function  5/22/2020 1547 by Adeola Carrasco RN  Outcome: Ongoing  5/22/2020 0408 by Brad Kevin RN  Outcome: Ongoing     Problem: Nutrition  Goal: Optimal nutrition therapy  5/22/2020 1547 by Adeola Carrasco RN  Outcome: Ongoing  5/22/2020 0408 by Brad Kevin RN  Outcome: Ongoing     Problem: Pain:  Goal: Pain level will decrease  Description: Pain level will decrease  Outcome: Ongoing  Goal: Control of acute pain  Description: Control of acute pain  5/22/2020 1547 by Adeola Carrasco RN  Outcome: Ongoing  5/22/2020 0408 by Brad Kevin RN  Outcome: Ongoing  Note: Pain controlled with prn medications this shift. Will continue to monitor.    Goal: Control of chronic pain  Description: Control of chronic pain  Outcome: Ongoing     Problem: Physical Regulation:  Goal: Will remain free from infection  Description: Will remain free from infection  5/22/2020 1547 by Adeola Carrasco RN  Outcome: Ongoing  5/22/2020 0408 by rBad Kevin RN  Outcome: Ongoing
Problem: Falls - Risk of:  Goal: Will remain free from falls  Description: Will remain free from falls  Outcome: Met This Shift  No falls or injuries sustained at this time. No attempts to get out of bed without nursing assistance. Call light within reach and pt. uses appropriately for assistance. Siderails up x 2. Nonskid footwear remains on. Bed in low and locked position. Hourly nursing rounds made. Pt. Alert and oriented, aware of limitations, and exhibits good safety judgement. Pt. uses assistive devices appropriately. Pt. understands individual fall risk factors.     Pt. reminded to use call light with each nurse/patient interaction.     Pt. room located close to nurse's station.      Bed alarm remains engaged throughout the shift as a precaution. Tele-sitter placed in pts room for added precaution,        Problem: Pain:  Goal: Pain level will decrease  Description: Pain level will decrease  Outcome: Met This Shift  Pain assessment completed so far this shift. Pt. able to rest without the use of pain medication. Patient denies any pain so far this shift. Will continue to monitor.        Problem: Skin Integrity:  Goal: Absence of new skin breakdown  Description: Absence of new skin breakdown  Outcome: Met This Shift   Skin assessment completed this shift. Nutrition and Hydration status assessed with adequate intake. Geo Score as charted. Bilateral heels remain elevated on pillows throughout the shift. Patient able to reposition self for comfort and to prevent breakdown. Patient verbalizes understanding of pressure ulcer prevention measures. Skin integrity maintained. No new skin breakdown noted. Skin to high risk pressure areas including coccyx and heels are clear.     Rani care provided as needed throughout the shift per pt.  [place] Moisture Barrier ointment applied to buttocks as a preventative measure.                              
Problem: Falls - Risk of:  Goal: Will remain free from falls  Description: Will remain free from falls  Outcome: Met This Shift  Note: Pt assessed as a fall risk this shift. Remains free from falls and accidental injury at this time. Fall precautions in place, including falling star sign and fall risk band on pt. Floor free from obstacles, and bed is locked and in lowest position. Adequate lighting provided. Pt encouraged to call before getting OOB for any need. Bed alarm activated. Will continue to monitor needs during hourly rounding, and reinforce education on use of call light. Goal: Absence of physical injury  Description: Absence of physical injury  Outcome: Met This Shift     Problem: Pain:  Goal: Pain level will decrease  Description: Pain level will decrease  Outcome: Met This Shift  Note: Patient denies pain. Problem: Physical Regulation:  Goal: Will remain free from infection  Description: Will remain free from infection  Outcome: Met This Shift  Note: Afebrile. VSS. No s/sx of new infection. Problem: Skin Integrity:  Goal: Will show no infection signs and symptoms  Description: Will show no infection signs and symptoms  Outcome: Met This Shift  Goal: Absence of new skin breakdown  Description: Absence of new skin breakdown  Outcome: Met This Shift  Note: No s/sx of new skin breakdown. Problem: Neurological  Goal: Maximum potential motor/sensory/cognitive function  Outcome: Ongoing  Note: Patient continues to work with therapy sessions to improve motor/sensory/cognitive function. Problem: Nutrition  Goal: Optimal nutrition therapy  Outcome: Ongoing  Note: Patient follows a general diet.
Problem: Falls - Risk of:  Goal: Will remain free from falls  Description: Will remain free from falls  Outcome: Ongoing  Note: Pt educated on and verbalizes understanding of fall risks. Pt wearing nonskid stockings, uses assistive devices appropriately, call light within reach, encouraged to ask for assistance. Will continue to monitor and intervene as needed. Problem: Pain:  Goal: Control of acute pain  Description: Control of acute pain  Outcome: Ongoing  Note: Pt denies having any pain this shift. Pt reports pain is controlled by current pain regimen. Will continue to monitor and medicate per MD orders.
in 30 minutes of therapeutic exercise/activity to promote increased safety and independence with self care and mobility tasks   Long term goals  Time Frame for Long term goals : by discharge, patient will  Long term goal 1: perform functional transfers/mobility during ADL routine with Modified [place] using DME as appropriate with Good safety  Long term goal 2: perform BADL tasks with independence/modified independence using compensatory techniques/DME as appropriate with Good safety  Long term goal 3: perform IADL tasks such as laundry/simple meal prep with independence/modified independence using DME as appropriate with Good safety  Long term goal 4: V/D Good understanding of BUE HEP for maintaining strength/coordination for functional tasks  Long term goal 5: V/D Good understanding of DME for increased safety during ADL routine for safe d/c home     Plan of Care: Patient to be seen by occupational therapy services 1 Hour per day at least 5 out of 7 days per week     Anticipated interventions may include ADL and IADL retraining, strengthening, safety education and training, patient/caregiver education and training, equipment evaluation/ training/procurement, neuromuscular reeducation, wheelchair mobility training.       SPEECH THERAPY:   Goals:             Short-term Goals  Goal 1: Pt will state biographical info 80% accuracy   Goal 2: Pt will name functional objects in confrontation  naming/responsive naming tasks 80% accuracy   Goal 3: Pt will answer y/n questions with 90% accuracy   Goal 4: Pt will follow 2-3 step commands 90% accuracy   Goal 5: Pt will communicate wants and needs effectively using AAC with returned demo at 100%  Goal 6: Pt will repeat simple SVO sentences 80% accuracy         Plan of Care: Pt to be seen by speech therapy services 1 Hour per day at least 5 out of 7 days per week    Anticipated interventions may include speech/language/communication therapy, cognitive training, group

## 2020-05-26 NOTE — DISCHARGE SUMMARY
Physical Medicine & Rehabilitation  Discharge Summary     Patient Identification:  Teagan Bae  : 1940  Admit date: 2020  Discharge date: 2020    Attending provider: Melissa Mohr MD        Primary care provider: No primary care provider on file. 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 )  Supine to Sit: Modified independent  Sit to Supine: Modified independent  Scooting: Modified independent  Transfers: Sit to Stand: Supervision(VCs for hand placement)  Stand to sit: Modified independent, Supervision(Mod I w/RW; Sup without)  Bed to Chair: Modified independent(Mod I w/RW)  Comment: rest breaks PRN, Ambulation 1  Surface: level tile, carpet  Device: Rolling Walker  Assistance: Modified Independent  Quality of Gait: NBOS, but no scissoring. Delayed (and reduced) L hip flexion at swing initiation. Gait Deviations: Decreased step length, Decreased step height  Distance: 300' x2 p.m. Comments: No LOB. Review of safety with RW., Stairs  # Steps : 16  Stairs Height: 8\"  Rails: Left ascending  Device: No Device  Assistance: Modified independent   Comment: Step-to pattern; no LOB. Mobility:  , PT Equipment Recommendations  Equipment Needed: Yes(Order for RW given to  )  Mobility Devices: Robbert Downy: Rolling  Other: DME ordered, Assessment: Increased fall risk without AD. Occupational therapy:  , Equipment Recommendations  Equipment Needed: (TBD), Assessment: Pt progressing well towards OT goals. Pt has demonstrated increased functional activity tolerance and improved standing balance. Pt demonstrated increased performance and safety with ADL and dynamic standing tasks this date.     Speech therapy:  Comprehension: 4 - Patient understands basic needs palpation bilaterally. No edema BLEs  SKIN: warm dry and intact with good turgor  PSYCH: appropriately interactive. Affect WNL. Rehab course:   Patient was medically stable throughout admission. She received extensive speech therapy to address her expressive aphasia and physical therapy to address her gait instability/impaired balance. Chronic conditions were stable on home medications. The patient participated in an aggressive multidisciplinary inpatient rehabilitation program involving 3 hours per day, 5 days per week of rehabilitation. Patient benefited from inpatient rehab and was discharged in good stable condition. Consults:   Internal Medicine    Significant Diagnostics:   CBC:   Lab Results   Component Value Date    WBC 4.4 05/20/2020    RBC 4.61 05/20/2020    HGB 14.2 05/20/2020    HCT 42.4 05/20/2020    MCV 92.0 05/20/2020    MCH 30.8 05/20/2020    MCHC 33.5 05/20/2020    RDW 13.0 05/20/2020     05/20/2020    MPV 8.2 05/20/2020     BMP:    Lab Results   Component Value Date     05/20/2020    K 3.4 05/20/2020     05/20/2020    CO2 22 05/20/2020    BUN 30 05/20/2020    LABALBU 4.2 05/11/2020    CREATININE 0.86 05/20/2020    CALCIUM 9.2 05/20/2020    GFRAA >60 05/20/2020    LABGLOM >60 05/20/2020    GLUCOSE 101 05/20/2020         Patient Instructions: Activity as tolerated    Medications, precautions and follow up reviewed with patient and family    Follow-up visits: See after visit summary from hospitalization: PCP 1-2 weeks, neurology 2-4 weeks, and PM&R 4-6 weeks.      Disposition:  Home with home health    Discharge Medications:   Cheryl Medication Instructions [id number]    Printed on:05/26/20 0928   Medication Information                      aspirin 81 MG EC tablet  Take 1 tablet by mouth daily             atorvastatin (LIPITOR) 80 MG tablet  Take 1 tablet by mouth nightly             clopidogrel (PLAVIX) 75 MG tablet  Take 1 tablet by mouth daily                 Venancio Mendosa MD

## 2020-05-26 NOTE — PROGRESS NOTES
Physical Therapy  [facility]: SHAYNA VALENTINE  Acute Rehabilitation Physical Therapy Progress Note    Date: 20  Patient Name: Wendy Cormier       Room: 5916/4064-35  MRN: [id number]   Account: [de-identified]   : 1940  (78 y.o.) Gender: female     Referring Practitioner: Lurdes Stevenson MD  Diagnosis: Acute CVA   Past Medical History:  has a past medical history of Arthritis. Past Surgical History:   has no past surgical history on file. Additional Pertinent Hx: 78-year-old female admitted with difficulty speaking. .  CT noted asymmetric hypoattenuation left parietal temporal lobe likely representing acute ischemia otherwise unremarkable. Outside TPA window. CTA head and neck completed unremarkable. Pt admitted to [facility] 20. Overall Orientation Status: Impaired(Expressive aphasia)  Orientation Level: Disoriented to place, Oriented to person, Disoriented to time, Oriented to situation(Needs cues for date/day & place)  Restrictions/Precautions  Restrictions/Precautions: Fall Risk  Required Braces or Orthoses?: No  Implants present? : (pt denies)  Position Activity Restriction  Other position/activity restrictions: up as tolerated    Subjective: Pt denies new complaints, still experiencing L knee pain after longer periods of inactivity. Comments: Reviewed how pt felt she did with Mod I w/RW in room, pt reports fine; pt observed after a.m. session amb without RW into bathroom. Vital Signs  Pulse: 125(peak, after steps)  Patient Currently in Pain: Denies    Oxygen Therapy  SpO2: 98 %  O2 Device: None (Room air)  Patient Observation  Observations: Expressive aphasia, Dupuytren's contracture (L 4/5 digits, R 2/4 digits), fast moving/impulsive at times. Transfers:  Sit to Stand: Supervision(VCs for hand placement)  Stand to sit: Modified independent;Supervision(Mod I w/RW; Sup without)  Bed to Chair: Modified independent(Mod I w/RW)   Stand pivot transfers:  Mod I w/RW; Sup without    Ambulation 1  Surface: level tile;carpet  Device: Rolling Walker  Assistance: Modified Independent  Quality of Gait: NBOS, but no scissoring. Delayed (and reduced) L hip flexion at swing initiation. Gait Deviations: Decreased step length;Decreased step height  Distance: 300' x2 p.m. Comments: No LOB. Review of safety with RW. Ambulation 2  Surface - 2: level tile  Device 2: No device  Assistance 2: Supervision;Stand by assistance  Quality of Gait 2: NBOS, but no scissoring, moments of unsteadiness. Demo's good & safe use of RW today; delayed (and reduced) L hip flexion at swing initiation. Gait Deviations: Decreased step length  Distance: 36' and similar distances in the gym  Comments: No LOB. Stairs  # Steps : 16  Stairs Height: 8\"  Rails: Left ascending  Assistance: Modified independent   Comment: Step-to pattern; no LOB. BALANCE Posture: Good  Sitting - Static: Good(EOB, no back or UE support)  Sitting - Dynamic: Good(EOB, no back or UE support)  Standing - Static: Good(No AD)  Standing - Dynamic: Poor(no AD)    EXERCISES    Other exercises?: Yes  Other exercises 1: (Education for home)  Other exercises 17: NuStep, L4, 15 min. Activity Tolerance: Patient Tolerated treatment well(Rest breaks PRN)     PT Equipment Recommendations  Equipment Needed: Yes(Order for RW given to  5/22)  Lai Owens: Rolling  Other: DME ordered     Current Treatment Recommendations: Strengthening, Gait Training, Patient/Caregiver Education & Training, Equipment Evaluation, Education, & procurement, Stair training, Balance Training, Functional Mobility Training, Endurance Training, Transfer Training, Safety Education & Training, Home Exercise Program    Conditions Requiring Skilled Therapeutic Intervention  Body structures, Functions, Activity limitations: Decreased functional mobility ; Decreased strength;Decreased balance;Decreased safe awareness  Assessment: Increased fall risk without AD.    Barriers to

## 2020-05-26 NOTE — PROGRESS NOTES
(secs): 40  Right 9-Hole Peg Test: Impaired  Right 9 Hole Peg Test Time (secs): 34  Fine Motor Comment: NORMS: 22-31, requires cueing for full followthrough of assessement, frequent dropping of pegs while completing with L hand. Assessment  Performance deficits / Impairments: Decreased functional mobility ; Decreased endurance;Decreased strength;Decreased ADL status; Decreased safe awareness;Decreased balance;Decreased coordination  Prognosis: Good  Discharge Recommendations: Patient would benefit from continued therapy after discharge;Home with assist PRN  Activity Tolerance: Patient Tolerated treatment well  Safety Devices in place: Yes  Type of devices: All fall risk precautions in place;Call light within reach; Left in chair(pt mod I in room; telesitter removed. )          Patient Education: EC/WS/pacing for ADL/IADL's, UB/hand HEP. Pt defered information on driving rehab program d/t no intentions to drive post stroke.    Patient Goals   Patient goals : to go home  Learner:patient  Method: demonstration and explanation  And handout     Outcome: acknowledged understanding of  and needs reinforcement        Plan  Plan  Times per week: 1.5 hr/day, 5-7x/wk   Times per day: Twice a day  Current Treatment Recommendations: Strengthening, Functional Mobility Training, Patient/Caregiver Education & Training, Endurance Training, Equipment Evaluation, Education, & procurement, Balance Training, Safety Education & Training, Self-Care / ADL, Home Management Training, Cognitive/Perceptual Training  Patient Goals   Patient goals : to go home  Short term goals  Time Frame for Short term goals: in 5 days, patient will  Short term goal 1: perform functional transfer with supervision assistance for safety only using DME as appropriate  Short term goal 2: perform toileting routine with supervision assistance for safety only using DME as appropriate   Short term goal 3: increase standing tolerance to 10 minutes with 0-1 UE

## 2020-05-29 ENCOUNTER — TELEPHONE (OUTPATIENT)
Dept: PHYSICAL MEDICINE AND REHAB | Age: 80
End: 2020-05-29

## 2020-09-10 ENCOUNTER — HOSPITAL ENCOUNTER (OUTPATIENT)
Age: 80
Setting detail: SPECIMEN
Discharge: HOME | End: 2020-09-10
Payer: COMMERCIAL

## 2020-09-10 LAB
-: ABNORMAL
ABSOLUTE EOS #: 0.1 K/UL (ref 0–0.4)
ABSOLUTE IMMATURE GRANULOCYTE: ABNORMAL K/UL (ref 0–0.3)
ABSOLUTE LYMPH #: 1.1 K/UL (ref 1–4.8)
ABSOLUTE MONO #: 0.3 K/UL (ref 0.1–1.3)
AMORPHOUS: ABNORMAL
ANION GAP SERPL CALCULATED.3IONS-SCNC: 11 MMOL/L (ref 9–17)
BACTERIA: ABNORMAL
BASOPHILS # BLD: 1 % (ref 0–2)
BASOPHILS ABSOLUTE: 0 K/UL (ref 0–0.2)
BILIRUBIN URINE: NEGATIVE
BUN BLDV-MCNC: 16 MG/DL (ref 8–23)
BUN/CREAT BLD: ABNORMAL (ref 9–20)
CALCIUM SERPL-MCNC: 9.4 MG/DL (ref 8.6–10.4)
CASTS UA: ABNORMAL /LPF
CHLORIDE BLD-SCNC: 104 MMOL/L (ref 98–107)
CO2: 25 MMOL/L (ref 20–31)
COLOR: YELLOW
COMMENT UA: ABNORMAL
CREAT SERPL-MCNC: 0.78 MG/DL (ref 0.5–0.9)
CRYSTALS, UA: ABNORMAL /HPF
DIFFERENTIAL TYPE: ABNORMAL
EOSINOPHILS RELATIVE PERCENT: 2 % (ref 0–4)
EPITHELIAL CELLS UA: ABNORMAL /HPF
GFR AFRICAN AMERICAN: >60 ML/MIN
GFR NON-AFRICAN AMERICAN: >60 ML/MIN
GFR SERPL CREATININE-BSD FRML MDRD: ABNORMAL ML/MIN/{1.73_M2}
GFR SERPL CREATININE-BSD FRML MDRD: ABNORMAL ML/MIN/{1.73_M2}
GLUCOSE BLD-MCNC: 91 MG/DL (ref 70–99)
GLUCOSE URINE: NEGATIVE
HCT VFR BLD CALC: 41 % (ref 36–46)
HEMOGLOBIN: 13.7 G/DL (ref 12–16)
IMMATURE GRANULOCYTES: ABNORMAL %
INR BLD: 0.9
KETONES, URINE: NEGATIVE
LEUKOCYTE ESTERASE, URINE: ABNORMAL
LYMPHOCYTES # BLD: 26 % (ref 24–44)
MCH RBC QN AUTO: 31.2 PG (ref 26–34)
MCHC RBC AUTO-ENTMCNC: 33.5 G/DL (ref 31–37)
MCV RBC AUTO: 93.2 FL (ref 80–100)
MONOCYTES # BLD: 8 % (ref 1–7)
MUCUS: ABNORMAL
NITRITE, URINE: NEGATIVE
NRBC AUTOMATED: ABNORMAL PER 100 WBC
OTHER OBSERVATIONS UA: ABNORMAL
PDW BLD-RTO: 14 % (ref 11.5–14.9)
PH UA: 6 (ref 5–8)
PLATELET # BLD: 298 K/UL (ref 150–450)
PLATELET ESTIMATE: ABNORMAL
PMV BLD AUTO: 7.9 FL (ref 6–12)
POTASSIUM SERPL-SCNC: 3.6 MMOL/L (ref 3.7–5.3)
PROTEIN UA: NEGATIVE
PROTHROMBIN TIME: 11.8 SEC (ref 11.8–14.6)
RBC # BLD: 4.4 M/UL (ref 4–5.2)
RBC # BLD: ABNORMAL 10*6/UL
RBC UA: ABNORMAL /HPF
RENAL EPITHELIAL, UA: ABNORMAL /HPF
SEG NEUTROPHILS: 63 % (ref 36–66)
SEGMENTED NEUTROPHILS ABSOLUTE COUNT: 2.7 K/UL (ref 1.3–9.1)
SODIUM BLD-SCNC: 140 MMOL/L (ref 135–144)
SPECIFIC GRAVITY UA: 1.01 (ref 1–1.03)
TRICHOMONAS: ABNORMAL
TURBIDITY: ABNORMAL
URINE HGB: NEGATIVE
UROBILINOGEN, URINE: NORMAL
WBC # BLD: 4.2 K/UL (ref 3.5–11)
WBC # BLD: ABNORMAL 10*3/UL
WBC UA: ABNORMAL /HPF
YEAST: ABNORMAL

## 2020-09-10 PROCEDURE — 36415 COLL VENOUS BLD VENIPUNCTURE: CPT

## 2020-09-10 PROCEDURE — 81001 URINALYSIS AUTO W/SCOPE: CPT

## 2020-09-10 PROCEDURE — 85610 PROTHROMBIN TIME: CPT

## 2020-09-10 PROCEDURE — 85025 COMPLETE CBC W/AUTO DIFF WBC: CPT

## 2020-09-10 PROCEDURE — 80048 BASIC METABOLIC PNL TOTAL CA: CPT

## 2022-02-14 NOTE — PROGRESS NOTES
Patient transferred to room 9701 Subjective   Patient ID: Efrain Garay ( 1957) is a 64 year old male.    Chief Complaint   Patient presents with   • New Patient     left inguinal hernia since 6 months ago   • Office Visit     HPI    Efrain Garay presents to clinic for evaluation of a left inguinal hernia. He reports feeling a bulge and discomfort since about 6 months. He denies any history of incarceration/strangulation or GI obstructive symptoms such as nausea/vomiting or changes in bowel habits. He had an ultrasound per his PCP, which confirmed the diagnosis of a fat-containing left inguinal hernia.    Efrain has no past medical history of No known problems.  Efrain has a past surgical history that includes arthrotomy,open repair meniscus (Bilateral) and total hip replacement (Left).  His family history includes Cancer, Lung in his mother.  Efrain reports that he has quit smoking. He has a 40.00 pack-year smoking history. He has never used smokeless tobacco. He reports previous alcohol use. He reports that he does not use drugs.  Efrain   No current outpatient medications on file.     No current facility-administered medications for this visit.     Efrain has No Known Allergies.    Review of Systems   Constitutional: Negative for chills, fatigue and fever.   HENT: Negative for sore throat.    Eyes: Negative for visual disturbance.   Respiratory: Negative for shortness of breath.    Cardiovascular: Negative for chest pain.   Gastrointestinal: Negative for nausea and vomiting.   Endocrine: Negative for polydipsia, polyphagia and polyuria.   Genitourinary: Negative for dysuria.   Musculoskeletal: Negative for myalgias.   Skin: Negative for rash.   Allergic/Immunologic: Negative for environmental allergies.   Neurological: Negative for headaches.   Hematological: Does not bruise/bleed easily.   Psychiatric/Behavioral: Negative for agitation.   All other systems reviewed and are negative.      Objective   Physical Exam  Vitals  reviewed.   Constitutional:       Appearance: Normal appearance.   HENT:      Head: Normocephalic.      Right Ear: External ear normal.      Left Ear: External ear normal.      Nose: Nose normal.      Mouth/Throat:      Mouth: Mucous membranes are moist.      Neck: Neck supple.   Eyes:      Conjunctiva/sclera: Conjunctivae normal.   Cardiovascular:      Rate and Rhythm: Normal rate and regular rhythm.   Pulmonary:      Effort: Pulmonary effort is normal.   Abdominal:      General: Abdomen is flat.      Palpations: Abdomen is soft.      Tenderness: There is no abdominal tenderness.      Hernia: A hernia is present. Hernia is present in the left inguinal area. There is no hernia in the right inguinal area.      Comments: Reducible, small fat-containing left inguinal hernia   Musculoskeletal:         General: Normal range of motion.   Skin:     General: Skin is warm.   Neurological:      General: No focal deficit present.      Mental Status: He is alert.   Psychiatric:         Mood and Affect: Mood normal.         IMAGING: US HERNIA PELVIS    Result Date: 2/10/2022  Narrative: EXAM:   US HERNIA PELVIS CLINICAL INDICATION: Inguinal hernia. COMPARISON:  None relevant TECHNIQUE:  Multiple sonographic images of the inguinal regions at rest and with Valsalva. FINDINGS:  In the left inguinal region, there is a small defect in the inguinal canal compatible with hernia. This contains fat but no bowel and increases slightly with Valsalva. This corresponds to the region of symptoms..     Impression: 1.    Fat-containing left inguinal hernia. Electronically Signed by: SURY WHEATLEY M.D. Signed on: 2/10/2022 1:11 PM         Assessment   Problem List Items Addressed This Visit        Gastrointestinal and Abdominal    Left inguinal hernia - Primary        After an educational discussion with Efrain Garay regarding the pertinent anatomy and pathophysiology of a left inguinal hernia, I recommended that he undergo a robotic  assisted laparoscopic possible open hernia repair, in order to prevent future complications from his hernia such as incarceration or strangulation. A minimally invasive approach will allow for less pain, faster recovery and evaluation of the contralateral groin. Should there be a contralateral hernia present, I explained that I will proceed with a concurrent repair in order to prevent a return trip to the OR in the future. The risks, benefits, complication as well as details of the procedure was discussed with the patient. All questions were answered to his satisfaction and he wishes to proceed with surgery.     In the interim, I have instructed him to monitor for signs of incarceration or strangulation such as irreducible hernia, acutely increasing pain, nausea/vomiting or changes in bowel habits. If he were to experience such symptoms, he was instructed to contact me immediately or present to the emergency room. Otherwise, I have ordered some pre-operative labs in preparation of general anesthesia and surgery, as needed.      Dodie Briggs MD, MS, FACS  [facility], General Surgery  [address], [address]  [phone]

## 2024-02-23 ENCOUNTER — HOSPITAL ENCOUNTER (INPATIENT)
Age: 84
LOS: 2 days | Discharge: HOME | DRG: 641 | End: 2024-02-25
Attending: EMERGENCY MEDICINE | Admitting: FAMILY MEDICINE
Payer: MEDICARE

## 2024-02-23 ENCOUNTER — APPOINTMENT (OUTPATIENT)
Dept: CT IMAGING | Age: 84
DRG: 641 | End: 2024-02-23
Payer: MEDICARE

## 2024-02-23 ENCOUNTER — APPOINTMENT (OUTPATIENT)
Dept: GENERAL RADIOLOGY | Age: 84
DRG: 641 | End: 2024-02-23
Payer: MEDICARE

## 2024-02-23 ENCOUNTER — APPOINTMENT (OUTPATIENT)
Dept: MRI IMAGING | Age: 84
DRG: 641 | End: 2024-02-23
Payer: MEDICARE

## 2024-02-23 DIAGNOSIS — R55 SYNCOPE AND COLLAPSE: Primary | ICD-10-CM

## 2024-02-23 DIAGNOSIS — S01.01XA LACERATION OF SCALP, INITIAL ENCOUNTER: ICD-10-CM

## 2024-02-23 DIAGNOSIS — S09.90XA INJURY OF HEAD, INITIAL ENCOUNTER: ICD-10-CM

## 2024-02-23 PROBLEM — N39.0 UTI (URINARY TRACT INFECTION): Status: ACTIVE | Noted: 2024-02-23

## 2024-02-23 LAB
ALBUMIN SERPL-MCNC: 4.2 G/DL (ref 3.5–5.2)
ALBUMIN/GLOB SERPL: 1.4 {RATIO} (ref 1–2.5)
ALP SERPL-CCNC: 69 U/L (ref 35–104)
ALT SERPL-CCNC: 9 U/L (ref 5–33)
ANION GAP SERPL CALCULATED.3IONS-SCNC: 13 MMOL/L (ref 9–17)
AST SERPL-CCNC: 15 U/L
BACTERIA URNS QL MICRO: ABNORMAL
BASOPHILS # BLD: 0.1 K/UL (ref 0–0.2)
BASOPHILS NFR BLD: 1 % (ref 0–2)
BILIRUB SERPL-MCNC: 0.6 MG/DL (ref 0.3–1.2)
BILIRUB UR QL STRIP: NEGATIVE
BUN SERPL-MCNC: 16 MG/DL (ref 8–23)
CALCIUM SERPL-MCNC: 10.1 MG/DL (ref 8.6–10.4)
CHARACTER UR: ABNORMAL
CHLORIDE SERPL-SCNC: 105 MMOL/L (ref 98–107)
CLARITY UR: ABNORMAL
CO2 SERPL-SCNC: 25 MMOL/L (ref 20–31)
COLOR UR: YELLOW
CREAT SERPL-MCNC: 1 MG/DL (ref 0.5–0.9)
EOSINOPHIL # BLD: 0 K/UL (ref 0–0.4)
EOSINOPHILS RELATIVE PERCENT: 0 % (ref 1–4)
EPI CELLS #/AREA URNS HPF: ABNORMAL /HPF (ref 0–5)
ERYTHROCYTE [DISTWIDTH] IN BLOOD BY AUTOMATED COUNT: 13.7 % (ref 12.5–15.4)
GFR SERPL CREATININE-BSD FRML MDRD: 55 ML/MIN/1.73M2
GLUCOSE SERPL-MCNC: 131 MG/DL (ref 70–99)
GLUCOSE UR STRIP-MCNC: NEGATIVE MG/DL
HCT VFR BLD AUTO: 42.2 % (ref 36–46)
HGB BLD-MCNC: 14.2 G/DL (ref 12–16)
HGB UR QL STRIP.AUTO: ABNORMAL
INR PPP: 0.9
KETONES UR STRIP-MCNC: ABNORMAL MG/DL
LEUKOCYTE ESTERASE UR QL STRIP: ABNORMAL
LIPASE SERPL-CCNC: 41 U/L (ref 13–60)
LYMPHOCYTES NFR BLD: 0.9 K/UL (ref 1–4.8)
LYMPHOCYTES RELATIVE PERCENT: 11 % (ref 24–44)
MAGNESIUM SERPL-MCNC: 2.2 MG/DL (ref 1.6–2.6)
MCH RBC QN AUTO: 30.7 PG (ref 26–34)
MCHC RBC AUTO-ENTMCNC: 33.8 G/DL (ref 31–37)
MCV RBC AUTO: 90.9 FL (ref 80–100)
MONOCYTES NFR BLD: 0.3 K/UL (ref 0.1–1.2)
MONOCYTES NFR BLD: 4 % (ref 2–11)
NEUTROPHILS NFR BLD: 84 % (ref 36–66)
NEUTS SEG NFR BLD: 7 K/UL (ref 1.8–7.7)
NITRITE UR QL STRIP: NEGATIVE
PARTIAL THROMBOPLASTIN TIME: 20.2 SEC (ref 21.3–31.3)
PH UR STRIP: 6 [PH] (ref 5–8)
PLATELET # BLD AUTO: 292 K/UL (ref 140–450)
PMV BLD AUTO: 7.1 FL (ref 6–12)
POTASSIUM SERPL-SCNC: 3.6 MMOL/L (ref 3.7–5.3)
PROT SERPL-MCNC: 7.2 G/DL (ref 6.4–8.3)
PROT UR STRIP-MCNC: ABNORMAL MG/DL
PROTHROMBIN TIME: 9.8 SEC (ref 9.4–12.6)
RBC # BLD AUTO: 4.65 M/UL (ref 4–5.2)
RBC #/AREA URNS HPF: ABNORMAL /HPF (ref 0–2)
SODIUM SERPL-SCNC: 143 MMOL/L (ref 135–144)
SP GR UR STRIP: 1.02 (ref 1–1.03)
TROPONIN I SERPL HS-MCNC: 12 NG/L (ref 0–14)
TROPONIN I SERPL HS-MCNC: 9 NG/L (ref 0–14)
UROBILINOGEN UR STRIP-ACNC: NORMAL EU/DL (ref 0–1)
WBC #/AREA URNS HPF: ABNORMAL /HPF (ref 0–5)
WBC OTHER # BLD: 8.4 K/UL (ref 3.5–11)

## 2024-02-23 PROCEDURE — 87086 URINE CULTURE/COLONY COUNT: CPT

## 2024-02-23 PROCEDURE — 72125 CT NECK SPINE W/O DYE: CPT

## 2024-02-23 PROCEDURE — 70551 MRI BRAIN STEM W/O DYE: CPT

## 2024-02-23 PROCEDURE — 80053 COMPREHEN METABOLIC PANEL: CPT

## 2024-02-23 PROCEDURE — 71045 X-RAY EXAM CHEST 1 VIEW: CPT

## 2024-02-23 PROCEDURE — 84484 ASSAY OF TROPONIN QUANT: CPT

## 2024-02-23 PROCEDURE — 90715 TDAP VACCINE 7 YRS/> IM: CPT | Performed by: EMERGENCY MEDICINE

## 2024-02-23 PROCEDURE — 81001 URINALYSIS AUTO W/SCOPE: CPT

## 2024-02-23 PROCEDURE — 96374 THER/PROPH/DIAG INJ IV PUSH: CPT

## 2024-02-23 PROCEDURE — 6360000002 HC RX W HCPCS: Performed by: EMERGENCY MEDICINE

## 2024-02-23 PROCEDURE — 85610 PROTHROMBIN TIME: CPT

## 2024-02-23 PROCEDURE — 85730 THROMBOPLASTIN TIME PARTIAL: CPT

## 2024-02-23 PROCEDURE — 83735 ASSAY OF MAGNESIUM: CPT

## 2024-02-23 PROCEDURE — 93005 ELECTROCARDIOGRAM TRACING: CPT | Performed by: EMERGENCY MEDICINE

## 2024-02-23 PROCEDURE — 99285 EMERGENCY DEPT VISIT HI MDM: CPT

## 2024-02-23 PROCEDURE — 83690 ASSAY OF LIPASE: CPT

## 2024-02-23 PROCEDURE — 36415 COLL VENOUS BLD VENIPUNCTURE: CPT

## 2024-02-23 PROCEDURE — 70450 CT HEAD/BRAIN W/O DYE: CPT

## 2024-02-23 PROCEDURE — 85025 COMPLETE CBC W/AUTO DIFF WBC: CPT

## 2024-02-23 PROCEDURE — 90471 IMMUNIZATION ADMIN: CPT | Performed by: EMERGENCY MEDICINE

## 2024-02-23 PROCEDURE — 2580000003 HC RX 258

## 2024-02-23 PROCEDURE — 2580000003 HC RX 258: Performed by: EMERGENCY MEDICINE

## 2024-02-23 PROCEDURE — 96375 TX/PRO/DX INJ NEW DRUG ADDON: CPT

## 2024-02-23 PROCEDURE — 1200000000 HC SEMI PRIVATE

## 2024-02-23 RX ORDER — ACETAMINOPHEN 325 MG/1
650 TABLET ORAL EVERY 6 HOURS PRN
Status: DISCONTINUED | OUTPATIENT
Start: 2024-02-23 | End: 2024-02-25 | Stop reason: HOSPADM

## 2024-02-23 RX ORDER — SODIUM CHLORIDE 0.9 % (FLUSH) 0.9 %
5-40 SYRINGE (ML) INJECTION PRN
Status: DISCONTINUED | OUTPATIENT
Start: 2024-02-23 | End: 2024-02-25 | Stop reason: HOSPADM

## 2024-02-23 RX ORDER — MAGNESIUM SULFATE IN WATER 40 MG/ML
2000 INJECTION, SOLUTION INTRAVENOUS PRN
Status: DISCONTINUED | OUTPATIENT
Start: 2024-02-23 | End: 2024-02-23 | Stop reason: SDUPTHER

## 2024-02-23 RX ORDER — SODIUM CHLORIDE 0.9 % (FLUSH) 0.9 %
5-40 SYRINGE (ML) INJECTION EVERY 12 HOURS SCHEDULED
Status: DISCONTINUED | OUTPATIENT
Start: 2024-02-23 | End: 2024-02-25 | Stop reason: HOSPADM

## 2024-02-23 RX ORDER — ONDANSETRON 4 MG/1
4 TABLET, ORALLY DISINTEGRATING ORAL EVERY 8 HOURS PRN
Status: DISCONTINUED | OUTPATIENT
Start: 2024-02-23 | End: 2024-02-23 | Stop reason: SDUPTHER

## 2024-02-23 RX ORDER — ONDANSETRON 4 MG/1
4 TABLET, ORALLY DISINTEGRATING ORAL EVERY 8 HOURS PRN
Status: DISCONTINUED | OUTPATIENT
Start: 2024-02-23 | End: 2024-02-25 | Stop reason: HOSPADM

## 2024-02-23 RX ORDER — ACETAMINOPHEN 650 MG/1
650 SUPPOSITORY RECTAL EVERY 6 HOURS PRN
Status: DISCONTINUED | OUTPATIENT
Start: 2024-02-23 | End: 2024-02-25 | Stop reason: HOSPADM

## 2024-02-23 RX ORDER — POTASSIUM CHLORIDE 20 MEQ/1
40 TABLET, EXTENDED RELEASE ORAL PRN
Status: DISCONTINUED | OUTPATIENT
Start: 2024-02-23 | End: 2024-02-25 | Stop reason: HOSPADM

## 2024-02-23 RX ORDER — POLYETHYLENE GLYCOL 3350 17 G/17G
17 POWDER, FOR SOLUTION ORAL DAILY PRN
Status: DISCONTINUED | OUTPATIENT
Start: 2024-02-23 | End: 2024-02-23 | Stop reason: SDUPTHER

## 2024-02-23 RX ORDER — SODIUM CHLORIDE 9 MG/ML
INJECTION, SOLUTION INTRAVENOUS PRN
Status: DISCONTINUED | OUTPATIENT
Start: 2024-02-23 | End: 2024-02-25 | Stop reason: HOSPADM

## 2024-02-23 RX ORDER — POLYETHYLENE GLYCOL 3350 17 G/17G
17 POWDER, FOR SOLUTION ORAL DAILY PRN
Status: DISCONTINUED | OUTPATIENT
Start: 2024-02-23 | End: 2024-02-25 | Stop reason: HOSPADM

## 2024-02-23 RX ORDER — ACETAMINOPHEN 325 MG/1
650 TABLET ORAL EVERY 6 HOURS PRN
Status: DISCONTINUED | OUTPATIENT
Start: 2024-02-23 | End: 2024-02-23

## 2024-02-23 RX ORDER — ONDANSETRON 2 MG/ML
4 INJECTION INTRAMUSCULAR; INTRAVENOUS ONCE
Status: COMPLETED | OUTPATIENT
Start: 2024-02-23 | End: 2024-02-23

## 2024-02-23 RX ORDER — ONDANSETRON 2 MG/ML
4 INJECTION INTRAMUSCULAR; INTRAVENOUS EVERY 6 HOURS PRN
Status: DISCONTINUED | OUTPATIENT
Start: 2024-02-23 | End: 2024-02-25 | Stop reason: HOSPADM

## 2024-02-23 RX ORDER — ONDANSETRON 2 MG/ML
4 INJECTION INTRAMUSCULAR; INTRAVENOUS EVERY 6 HOURS PRN
Status: DISCONTINUED | OUTPATIENT
Start: 2024-02-23 | End: 2024-02-23 | Stop reason: SDUPTHER

## 2024-02-23 RX ORDER — MAGNESIUM SULFATE IN WATER 40 MG/ML
2000 INJECTION, SOLUTION INTRAVENOUS PRN
Status: DISCONTINUED | OUTPATIENT
Start: 2024-02-23 | End: 2024-02-25 | Stop reason: HOSPADM

## 2024-02-23 RX ORDER — POTASSIUM CHLORIDE 7.45 MG/ML
10 INJECTION INTRAVENOUS PRN
Status: DISCONTINUED | OUTPATIENT
Start: 2024-02-23 | End: 2024-02-23 | Stop reason: SDUPTHER

## 2024-02-23 RX ORDER — POTASSIUM CHLORIDE 20 MEQ/1
40 TABLET, EXTENDED RELEASE ORAL PRN
Status: DISCONTINUED | OUTPATIENT
Start: 2024-02-23 | End: 2024-02-23 | Stop reason: SDUPTHER

## 2024-02-23 RX ORDER — POTASSIUM CHLORIDE 7.45 MG/ML
10 INJECTION INTRAVENOUS PRN
Status: DISCONTINUED | OUTPATIENT
Start: 2024-02-23 | End: 2024-02-25 | Stop reason: HOSPADM

## 2024-02-23 RX ORDER — ACETAMINOPHEN 650 MG/1
650 SUPPOSITORY RECTAL EVERY 6 HOURS PRN
Status: DISCONTINUED | OUTPATIENT
Start: 2024-02-23 | End: 2024-02-23

## 2024-02-23 RX ADMIN — ONDANSETRON 4 MG: 2 INJECTION INTRAMUSCULAR; INTRAVENOUS at 17:16

## 2024-02-23 RX ADMIN — CEFTRIAXONE SODIUM 1000 MG: 1 INJECTION, POWDER, FOR SOLUTION INTRAMUSCULAR; INTRAVENOUS at 15:56

## 2024-02-23 RX ADMIN — TETANUS TOXOID, REDUCED DIPHTHERIA TOXOID AND ACELLULAR PERTUSSIS VACCINE, ADSORBED 0.5 ML: 5; 2.5; 8; 8; 2.5 SUSPENSION INTRAMUSCULAR at 17:17

## 2024-02-23 RX ADMIN — SODIUM CHLORIDE, PRESERVATIVE FREE 10 ML: 5 INJECTION INTRAVENOUS at 20:24

## 2024-02-23 RX ADMIN — SODIUM CHLORIDE, PRESERVATIVE FREE 10 ML: 5 INJECTION INTRAVENOUS at 20:31

## 2024-02-23 ASSESSMENT — PAIN - FUNCTIONAL ASSESSMENT: PAIN_FUNCTIONAL_ASSESSMENT: NONE - DENIES PAIN

## 2024-02-23 NOTE — ED NOTES
RN at bedside. Patient son at bedside. He ripped C-collar off patient. Patient requested water, RN explained that no water until doctor sees her.    [facility]     Emergency Department     Faculty Attestation    I performed a history and physical examination of the patient and discussed management with the resident. I reviewed the residents note and agree with the documented findings including all diagnostic interpretations and plan of care. Any areas of disagreement are noted on the chart. I was personally present for the key portions of any procedures. I have documented in the chart those procedures where I was not present during the key portions. I have reviewed the emergency nurses triage note. I agree with the chief complaint, past medical history, past surgical history, allergies, medications, social and family history as documented unless otherwise noted below. Documentation of the HPI, Physical Exam and Medical Decision Making performed by scribes is based on my personal performance of the HPI, PE and MDM. For Physician Assistant/ Nurse Practitioner cases/documentation I have personally evaluated this patient and have completed at least one if not all key elements of the E/M (history, physical exam, and MDM). Additional findings are as noted. This patient was evaluated in the Emergency Department for symptoms described in the history of present illness. He/she was evaluated in the context of the global COVID-19 pandemic, which necessitated consideration that the patient might be at risk for infection with the SARS-CoV-2 virus that causes COVID-19. Institutional protocols and algorithms that pertain to the evaluation of patients at risk for COVID-19 are in a state of rapid change based on information released by regulatory bodies including the CDC and federal and state organizations. These policies and algorithms were followed during the patient's care in the ED. Primary Care Physician: Jacob lAmeida, DARRIAN - CNP    History:  This is a 22 y.o. female who presents to the Emergency Department with complaint of cough, congestion. Several days. No fevers. Has been coughing up some clear sputum. No shortness of breath. Physical:     height is 5' 3\" (1.6 m) and weight is 161 lb (73 kg). Her oral temperature is 98.2 °F (36.8 °C). Her blood pressure is 114/66 and her pulse is 94. Her respiration is 18 and oxygen saturation is 99%.    22 y.o. female no acute distress, cardiac exam regular rate and rhythm no murmurs rubs gallops, pulmonary clear bilaterally occasional cough, oropharynx clear no exudates no erythema.     Impression: Upper respiratory infection    Plan: Symptomatic treatment, discharge    Celestina Bush MD, Bran Lux  Attending Emergency Physician         Elmer Del Rio MD  07/30/21 [id number]

## 2024-02-23 NOTE — ED PROVIDER NOTES
/HPF    Epithelial Cells UA 2 TO 5 0 - 5 /HPF    Bacteria, UA FEW (A) None    Other Observations UA Culture ordered based on defined criteria. (A) NOT REQ.       EMERGENCY DEPARTMENT COURSE:     The patient was given the following medications:  Orders Placed This Encounter   Medications    cefTRIAXone (ROCEPHIN) 1,000 mg in sodium chloride 0.9 % 50 mL IVPB (mini-bag)     Order Specific Question:   Antimicrobial Indications     Answer:   Urinary Tract Infection        Vitals:    Vitals:    02/23/24 1216   BP: (!) 167/106   Pulse: 97   Resp: 18   Temp: 98.2 °F (36.8 °C)   TempSrc: Oral   SpO2: 98%   Weight: 55 kg (121 lb 4.1 oz)   Height: 1.58 m (5' 2.21\")     -------------------------  BP: (!) 167/106, Temp: 98.2 °F (36.8 °C), Pulse: 97, Respirations: 18      Re-evaluation Notes    Patient doing well on reevaluation.  Remains asymptomatic.  I feel the patient will need admitted for further monitoring observation.  I spoke with the hospitalist who accepted admission of the patient.  They are comfortable with this plan      CONSULTS:    None    CRITICAL CARE:     None    PROCEDURES:    I did have to place 4 staples to the scalp after further exploration and cleaning.  Tolerated the procedure well.  Initially cleansed with chlorhexidine.  Antibiotics also started secondary to UTI that we will also cover skin nohelia for the scalp.  Will update her tetanus.  Minimal blood loss    FINAL IMPRESSION      1. Syncope and collapse    2. Injury of head, initial encounter    3. Laceration of scalp, initial encounter          DISPOSITION/PLAN   DISPOSITION Decision To Admit 02/23/2024 03:06:36 PM      Condition on Disposition    Improved    PATIENT REFERRED TO:  No follow-up provider specified.    DISCHARGE MEDICATIONS:  Current Discharge Medication List          (Please note that portions of this note were completed with a voice recognition program.  Efforts were made to edit the dictations but occasionally words are

## 2024-02-23 NOTE — H&P
Status: She is alert and oriented to person, place, and time.      Sensory: No sensory deficit.      Motor: No weakness.   Psychiatric:         Mood and Affect: Mood normal.         Behavior: Behavior normal.              Investigations:      Laboratory Testing:  Recent Results (from the past 24 hour(s))   EKG 12 Lead    Collection Time: 02/23/24  1:04 PM   Result Value Ref Range    Ventricular Rate 87 BPM    Atrial Rate 87 BPM    P-R Interval 150 ms    QRS Duration 88 ms    Q-T Interval 402 ms    QTc Calculation (Bazett) 483 ms    P Axis 47 degrees    R Axis -3 degrees    T Axis 80 degrees   Troponin    Collection Time: 02/23/24  1:35 PM   Result Value Ref Range    Troponin, High Sensitivity 12 0 - 14 ng/L   Comprehensive Metabolic Panel    Collection Time: 02/23/24  1:35 PM   Result Value Ref Range    Sodium 143 135 - 144 mmol/L    Potassium 3.6 (L) 3.7 - 5.3 mmol/L    Chloride 105 98 - 107 mmol/L    CO2 25 20 - 31 mmol/L    Anion Gap 13 9 - 17 mmol/L    Glucose 131 (H) 70 - 99 mg/dL    BUN 16 8 - 23 mg/dL    Creatinine 1.0 (H) 0.5 - 0.9 mg/dL    Est, Glom Filt Rate 55 (L) >60 mL/min/1.73m2    Calcium 10.1 8.6 - 10.4 mg/dL    Total Protein 7.2 6.4 - 8.3 g/dL    Albumin 4.2 3.5 - 5.2 g/dL    Albumin/Globulin Ratio 1.4 1.0 - 2.5    Total Bilirubin 0.6 0.3 - 1.2 mg/dL    Alkaline Phosphatase 69 35 - 104 U/L    ALT 9 5 - 33 U/L    AST 15 <32 U/L   CBC with Auto Differential    Collection Time: 02/23/24  1:35 PM   Result Value Ref Range    WBC 8.4 3.5 - 11.0 k/uL    RBC 4.65 4.0 - 5.2 m/uL    Hemoglobin 14.2 12.0 - 16.0 g/dL    Hematocrit 42.2 36 - 46 %    MCV 90.9 80 - 100 fL    MCH 30.7 26 - 34 pg    MCHC 33.8 31 - 37 g/dL    RDW 13.7 12.5 - 15.4 %    Platelets 292 140 - 450 k/uL    MPV 7.1 6.0 - 12.0 fL    Neutrophils % 84 (H) 36 - 66 %    Lymphocytes % 11 (L) 24 - 44 %    Monocytes % 4 2 - 11 %    Eosinophils % 0 (L) 1 - 4 %    Basophils % 1 0 - 2 %    Neutrophils Absolute 7.00 1.8 - 7.7 k/uL    Lymphocytes

## 2024-02-24 PROBLEM — I69.30 HISTORY OF CEREBROVASCULAR ACCIDENT (CVA) WITH RESIDUAL DEFICIT: Status: ACTIVE | Noted: 2024-02-24

## 2024-02-24 PROBLEM — R51.9 ACUTE NONINTRACTABLE HEADACHE: Status: ACTIVE | Noted: 2024-02-24

## 2024-02-24 PROBLEM — E87.6 HYPOKALEMIA: Status: ACTIVE | Noted: 2024-02-24

## 2024-02-24 PROBLEM — R11.0 NAUSEA: Status: ACTIVE | Noted: 2024-02-24

## 2024-02-24 LAB
ANION GAP SERPL CALCULATED.3IONS-SCNC: 9 MMOL/L (ref 9–17)
BASOPHILS # BLD: 0.05 K/UL (ref 0–0.2)
BASOPHILS NFR BLD: 1 % (ref 0–2)
BUN SERPL-MCNC: 19 MG/DL (ref 8–23)
CALCIUM SERPL-MCNC: 9.8 MG/DL (ref 8.6–10.4)
CHLORIDE SERPL-SCNC: 106 MMOL/L (ref 98–107)
CO2 SERPL-SCNC: 26 MMOL/L (ref 20–31)
CREAT SERPL-MCNC: 0.9 MG/DL (ref 0.5–0.9)
EOSINOPHIL # BLD: 0.05 K/UL (ref 0–0.4)
EOSINOPHILS RELATIVE PERCENT: 1 % (ref 1–4)
ERYTHROCYTE [DISTWIDTH] IN BLOOD BY AUTOMATED COUNT: 14.3 % (ref 12.5–15.4)
GFR SERPL CREATININE-BSD FRML MDRD: >60 ML/MIN/1.73M2
GLUCOSE SERPL-MCNC: 101 MG/DL (ref 70–99)
HCT VFR BLD AUTO: 39.8 % (ref 36–46)
HGB BLD-MCNC: 13.3 G/DL (ref 12–16)
LYMPHOCYTES NFR BLD: 1.13 K/UL (ref 1–4.8)
LYMPHOCYTES RELATIVE PERCENT: 25 % (ref 24–44)
MCH RBC QN AUTO: 30.3 PG (ref 26–34)
MCHC RBC AUTO-ENTMCNC: 33.5 G/DL (ref 31–37)
MCV RBC AUTO: 90.4 FL (ref 80–100)
MICROORGANISM SPEC CULT: NORMAL
MONOCYTES NFR BLD: 0.41 K/UL (ref 0.1–1.2)
MONOCYTES NFR BLD: 9 % (ref 2–11)
MORPHOLOGY: NORMAL
NEUTROPHILS NFR BLD: 64 % (ref 36–66)
NEUTS SEG NFR BLD: 2.86 K/UL (ref 1.8–7.7)
PLATELET # BLD AUTO: 276 K/UL (ref 140–450)
PMV BLD AUTO: 7.4 FL (ref 6–12)
POTASSIUM SERPL-SCNC: 3.8 MMOL/L (ref 3.7–5.3)
RBC # BLD AUTO: 4.4 M/UL (ref 4–5.2)
SODIUM SERPL-SCNC: 141 MMOL/L (ref 135–144)
SPECIMEN DESCRIPTION: NORMAL
WBC OTHER # BLD: 4.5 K/UL (ref 3.5–11)

## 2024-02-24 PROCEDURE — 97162 PT EVAL MOD COMPLEX 30 MIN: CPT

## 2024-02-24 PROCEDURE — 85025 COMPLETE CBC W/AUTO DIFF WBC: CPT

## 2024-02-24 PROCEDURE — 6360000002 HC RX W HCPCS: Performed by: FAMILY MEDICINE

## 2024-02-24 PROCEDURE — 97535 SELF CARE MNGMENT TRAINING: CPT

## 2024-02-24 PROCEDURE — 97166 OT EVAL MOD COMPLEX 45 MIN: CPT

## 2024-02-24 PROCEDURE — 2580000003 HC RX 258

## 2024-02-24 PROCEDURE — 6370000000 HC RX 637 (ALT 250 FOR IP)

## 2024-02-24 PROCEDURE — 97116 GAIT TRAINING THERAPY: CPT

## 2024-02-24 PROCEDURE — 6360000002 HC RX W HCPCS

## 2024-02-24 PROCEDURE — 36415 COLL VENOUS BLD VENIPUNCTURE: CPT

## 2024-02-24 PROCEDURE — 80048 BASIC METABOLIC PNL TOTAL CA: CPT

## 2024-02-24 PROCEDURE — 1200000000 HC SEMI PRIVATE

## 2024-02-24 PROCEDURE — 99222 1ST HOSP IP/OBS MODERATE 55: CPT | Performed by: FAMILY MEDICINE

## 2024-02-24 RX ORDER — ACETAMINOPHEN 325 MG/1
650 TABLET ORAL EVERY 6 HOURS
Status: DISCONTINUED | OUTPATIENT
Start: 2024-02-24 | End: 2024-02-25 | Stop reason: HOSPADM

## 2024-02-24 RX ORDER — ENOXAPARIN SODIUM 100 MG/ML
40 INJECTION SUBCUTANEOUS DAILY
Status: DISCONTINUED | OUTPATIENT
Start: 2024-02-24 | End: 2024-02-25 | Stop reason: HOSPADM

## 2024-02-24 RX ORDER — METOCLOPRAMIDE HYDROCHLORIDE 5 MG/ML
10 INJECTION INTRAMUSCULAR; INTRAVENOUS ONCE
Status: COMPLETED | OUTPATIENT
Start: 2024-02-24 | End: 2024-02-24

## 2024-02-24 RX ADMIN — ACETAMINOPHEN 650 MG: 325 TABLET ORAL at 10:59

## 2024-02-24 RX ADMIN — ACETAMINOPHEN 650 MG: 325 TABLET ORAL at 20:41

## 2024-02-24 RX ADMIN — ONDANSETRON 4 MG: 2 INJECTION INTRAMUSCULAR; INTRAVENOUS at 15:06

## 2024-02-24 RX ADMIN — SODIUM CHLORIDE, PRESERVATIVE FREE 10 ML: 5 INJECTION INTRAVENOUS at 20:44

## 2024-02-24 RX ADMIN — METOCLOPRAMIDE 10 MG: 5 INJECTION, SOLUTION INTRAMUSCULAR; INTRAVENOUS at 15:07

## 2024-02-24 RX ADMIN — CEFTRIAXONE SODIUM 1000 MG: 1 INJECTION, POWDER, FOR SOLUTION INTRAMUSCULAR; INTRAVENOUS at 15:12

## 2024-02-24 ASSESSMENT — PAIN SCALES - GENERAL: PAINLEVEL_OUTOF10: 3

## 2024-02-25 VITALS
WEIGHT: 154.32 LBS | BODY MASS INDEX: 28.4 KG/M2 | RESPIRATION RATE: 14 BRPM | SYSTOLIC BLOOD PRESSURE: 134 MMHG | OXYGEN SATURATION: 96 % | DIASTOLIC BLOOD PRESSURE: 87 MMHG | HEART RATE: 106 BPM | TEMPERATURE: 97.5 F | HEIGHT: 62 IN

## 2024-02-25 PROBLEM — I95.1 ORTHOSTATIC HYPOTENSION: Status: ACTIVE | Noted: 2024-02-25

## 2024-02-25 LAB
ANION GAP SERPL CALCULATED.3IONS-SCNC: 12 MMOL/L (ref 9–17)
BASOPHILS # BLD: 0 K/UL (ref 0–0.2)
BASOPHILS NFR BLD: 1 % (ref 0–2)
BUN SERPL-MCNC: 20 MG/DL (ref 8–23)
CALCIUM SERPL-MCNC: 9.7 MG/DL (ref 8.6–10.4)
CHLORIDE SERPL-SCNC: 105 MMOL/L (ref 98–107)
CO2 SERPL-SCNC: 25 MMOL/L (ref 20–31)
CREAT SERPL-MCNC: 0.8 MG/DL (ref 0.5–0.9)
EKG ATRIAL RATE: 87 BPM
EKG P AXIS: 47 DEGREES
EKG P-R INTERVAL: 150 MS
EKG Q-T INTERVAL: 402 MS
EKG QRS DURATION: 88 MS
EKG QTC CALCULATION (BAZETT): 483 MS
EKG R AXIS: -3 DEGREES
EKG T AXIS: 80 DEGREES
EKG VENTRICULAR RATE: 87 BPM
EOSINOPHIL # BLD: 0.1 K/UL (ref 0–0.4)
EOSINOPHILS RELATIVE PERCENT: 2 % (ref 1–4)
ERYTHROCYTE [DISTWIDTH] IN BLOOD BY AUTOMATED COUNT: 13.6 % (ref 12.5–15.4)
GFR SERPL CREATININE-BSD FRML MDRD: >60 ML/MIN/1.73M2
GLUCOSE SERPL-MCNC: 87 MG/DL (ref 70–99)
HCT VFR BLD AUTO: 42.2 % (ref 36–46)
HGB BLD-MCNC: 14.1 G/DL (ref 12–16)
LYMPHOCYTES NFR BLD: 1.3 K/UL (ref 1–4.8)
LYMPHOCYTES RELATIVE PERCENT: 30 % (ref 24–44)
MCH RBC QN AUTO: 30.2 PG (ref 26–34)
MCHC RBC AUTO-ENTMCNC: 33.3 G/DL (ref 31–37)
MCV RBC AUTO: 90.5 FL (ref 80–100)
MONOCYTES NFR BLD: 0.4 K/UL (ref 0.1–1.2)
MONOCYTES NFR BLD: 8 % (ref 2–11)
NEUTROPHILS NFR BLD: 59 % (ref 36–66)
NEUTS SEG NFR BLD: 2.6 K/UL (ref 1.8–7.7)
PLATELET # BLD AUTO: 258 K/UL (ref 140–450)
PMV BLD AUTO: 7.4 FL (ref 6–12)
POTASSIUM SERPL-SCNC: 3.6 MMOL/L (ref 3.7–5.3)
RBC # BLD AUTO: 4.66 M/UL (ref 4–5.2)
SODIUM SERPL-SCNC: 142 MMOL/L (ref 135–144)
WBC OTHER # BLD: 4.5 K/UL (ref 3.5–11)

## 2024-02-25 PROCEDURE — 85025 COMPLETE CBC W/AUTO DIFF WBC: CPT

## 2024-02-25 PROCEDURE — 99238 HOSP IP/OBS DSCHRG MGMT 30/<: CPT | Performed by: FAMILY MEDICINE

## 2024-02-25 PROCEDURE — 80048 BASIC METABOLIC PNL TOTAL CA: CPT

## 2024-02-25 PROCEDURE — 36415 COLL VENOUS BLD VENIPUNCTURE: CPT

## 2024-02-25 NOTE — PLAN OF CARE
Problem: Discharge Planning  Goal: Discharge to home or other facility with appropriate resources  2/25/2024 1017 by Karine Wright RN  Outcome: Progressing  2/25/2024 0134 by Bashir Burnham RN  Outcome: Progressing  Flowsheets (Taken 2/24/2024 2000)  Discharge to home or other facility with appropriate resources:   Identify barriers to discharge with patient and caregiver   Arrange for needed discharge resources and transportation as appropriate   Identify discharge learning needs (meds, wound care, etc)     Problem: Safety - Adult  Goal: Free from fall injury  2/25/2024 1017 by Karine Wright RN  Outcome: Progressing  2/25/2024 0134 by Bashir Burnham RN  Outcome: Progressing     Problem: Skin/Tissue Integrity  Goal: Absence of new skin breakdown  Description: 1.  Monitor for areas of redness and/or skin breakdown  2.  Assess vascular access sites hourly  3.  Every 4-6 hours minimum:  Change oxygen saturation probe site  4.  Every 4-6 hours:  If on nasal continuous positive airway pressure, respiratory therapy assess nares and determine need for appliance change or resting period.  2/25/2024 1017 by Karine Wright RN  Outcome: Progressing  2/25/2024 0134 by Bashir Burnham RN  Outcome: Progressing     Problem: Skin/Tissue Integrity - Adult  Goal: Skin integrity remains intact  2/25/2024 1017 by Karine Wright RN  Outcome: Progressing  2/25/2024 0134 by Bashir Burnham RN  Outcome: Progressing  Flowsheets (Taken 2/24/2024 2000)  Skin Integrity Remains Intact: Monitor for areas of redness and/or skin breakdown     Problem: Musculoskeletal - Adult  Goal: Return mobility to safest level of function  2/25/2024 1017 by Karine Wright RN  Outcome: Progressing  2/25/2024 0134 by Bashir Burnham RN  Outcome: Progressing  Flowsheets (Taken 2/24/2024 2000)  Return Mobility to Safest Level of Function: Assess patient stability and activity tolerance for standing, transferring and ambulating with or without assistive 
  Problem: Discharge Planning  Goal: Discharge to home or other facility with appropriate resources  Outcome: Progressing  Flowsheets (Taken 2/23/2024 2000)  Discharge to home or other facility with appropriate resources:   Identify barriers to discharge with patient and caregiver   Arrange for needed discharge resources and transportation as appropriate   Identify discharge learning needs (meds, wound care, etc)     Problem: Safety - Adult  Goal: Free from fall injury  Outcome: Progressing     Problem: Skin/Tissue Integrity  Goal: Absence of new skin breakdown  Description: 1.  Monitor for areas of redness and/or skin breakdown  2.  Assess vascular access sites hourly  3.  Every 4-6 hours minimum:  Change oxygen saturation probe site  4.  Every 4-6 hours:  If on nasal continuous positive airway pressure, respiratory therapy assess nares and determine need for appliance change or resting period.  Outcome: Progressing     Problem: Skin/Tissue Integrity - Adult  Goal: Skin integrity remains intact  Outcome: Progressing     Problem: Musculoskeletal - Adult  Goal: Return mobility to safest level of function  Outcome: Progressing     
  Problem: Discharge Planning  Goal: Discharge to home or other facility with appropriate resources  Outcome: Progressing  Flowsheets (Taken 2/24/2024 2000)  Discharge to home or other facility with appropriate resources:   Identify barriers to discharge with patient and caregiver   Arrange for needed discharge resources and transportation as appropriate   Identify discharge learning needs (meds, wound care, etc)     Problem: Safety - Adult  Goal: Free from fall injury  Outcome: Progressing     Problem: Skin/Tissue Integrity  Goal: Absence of new skin breakdown  Description: 1.  Monitor for areas of redness and/or skin breakdown  2.  Assess vascular access sites hourly  3.  Every 4-6 hours minimum:  Change oxygen saturation probe site  4.  Every 4-6 hours:  If on nasal continuous positive airway pressure, respiratory therapy assess nares and determine need for appliance change or resting period.  Outcome: Progressing     Problem: Skin/Tissue Integrity - Adult  Goal: Skin integrity remains intact  Outcome: Progressing  Flowsheets (Taken 2/24/2024 2000)  Skin Integrity Remains Intact: Monitor for areas of redness and/or skin breakdown     Problem: Musculoskeletal - Adult  Goal: Return mobility to safest level of function  Outcome: Progressing  Flowsheets (Taken 2/24/2024 2000)  Return Mobility to Safest Level of Function: Assess patient stability and activity tolerance for standing, transferring and ambulating with or without assistive devices     
RN  Outcome: Adequate for Discharge  2/25/2024 1017 by Karine Wright RN  Outcome: Progressing  2/25/2024 0134 by Bashir Burnham RN  Outcome: Progressing  Flowsheets (Taken 2/24/2024 2000)  Return Mobility to Safest Level of Function: Assess patient stability and activity tolerance for standing, transferring and ambulating with or without assistive devices     Problem: Chronic Conditions and Co-morbidities  Goal: Patient's chronic conditions and co-morbidity symptoms are monitored and maintained or improved  2/25/2024 1420 by Karine Wright RN  Outcome: Adequate for Discharge  2/25/2024 1017 by Karine Wright RN  Outcome: Progressing  2/25/2024 0134 by Bashir Burnham RN  Outcome: Progressing  Flowsheets (Taken 2/24/2024 2000)  Care Plan - Patient's Chronic Conditions and Co-Morbidity Symptoms are Monitored and Maintained or Improved: Monitor and assess patient's chronic conditions and comorbid symptoms for stability, deterioration, or improvement

## 2024-02-25 NOTE — DISCHARGE INSTRUCTIONS
Date of admission: 2/23/2024  Date of discharge: 02/25/24    Your care was provided by the attending and resident physicians of the [facility]. The primary encounter diagnosis was Syncope and collapse. Diagnoses of Injury of head, initial encounter and Laceration of scalp, initial encounter were also pertinent to this visit.    FOLLOW-UP  - Follow up with your primary care physician in 2 days.    MEDICATIONS  - Continue taking your other home medications as directed.    ADDITIONAL INSTRUCTIONS  - Remember to drink water throughout the day and wear compression stockings  - follow up with your primary care physician to go over your blood pressure  - Please return immediately to the [facility] Emergency Department if there is fever, vomiting, loss of consciousness, or any other concerning symptoms.

## 2024-02-25 NOTE — DISCHARGE SUMMARY
[facility]  Inpatient Service    Discharge Summary     Patient ID: Dolly Guzmán  :  1940   MRN: [id number]     ACCOUNT:  [id number]   Patient's PCP: Bandar Guerrero MD  Admit Date: 2024   Discharge Date: 2024  Length of Stay: 2  Code Status:  Full Code  Admitting Physician: Edi Lama MD  Discharge Physician: Edi Lama MD    Active Discharge Diagnoses:     Hospital Problem Lists:  Principal Problem:    Syncope and collapse  Active Problems:    Broca's aphasia    UTI (urinary tract infection)    Nausea    History of cerebrovascular accident (CVA) with residual deficit    Acute nonintractable headache    Hypokalemia  Resolved Problems:    * No resolved hospital problems. *      Admission Condition:  fair     Discharged Condition: good    Hospital Stay:     Hospital Course:  Dolly Guzmán is a 83 y.o. 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     In the ED, she had 4 staples to the back of her head and given the Tdap boostrix vaccine.  Urine analysis was done and was positive for leukocyte Estrace and a few bacteria.  Urine culture was done and patient was started on ceftriaxone.  CT head was obtained showing old infarct of the left parietooccipital region. Small patchy hypoattenuation R occipital lobe.  Due to this MRI was obtained showing no acute changes.    While hospitalized PT and OT were consulted.  Orthostatics were attempted on multiple occasions where patient began to vomit after being set up and had to be resumed at a later time.  On day of discharge patient was able to get orthostatics

## 2024-02-26 ENCOUNTER — TELEPHONE (OUTPATIENT)
Dept: FAMILY MEDICINE CLINIC | Age: 84
End: 2024-02-26

## 2024-02-26 NOTE — TELEPHONE ENCOUNTER
[facility] Transitions Initial Follow Up Call    Outreach made within 2 business days of discharge: Yes    Patient: Dolly Guzmán Patient : 1940   MRN: [id number]  Reason for Admission: There are no discharge diagnoses documented for the most recent discharge.  Discharge Date: 24        If Virtual visit made for hospital follow up, advise patient to make sure to have family member present to help assist with visit. Please make sure mobile number is updated in patient chart.     Discharge department/facility: [facility] Interactive Patient Contact:  Was patient able to fill all prescriptions: No:   Was patient instructed to bring all medications to the follow-up visit: N/A  Is patient taking all medications as directed in the discharge summary? N/A  Does patient understand their discharge instructions: Yes  Does patient have questions or concerns that need addressed prior to 7-14 day follow up office visit: no    Scheduled appointment with PCP within 7-14 days    Follow Up  Future Appointments   Date Time Provider Department Center   3/1/2024  1:30 PM Bandar Guerrero MD [id number]       Kelly Paulino MA

## 2024-02-29 NOTE — PROGRESS NOTES
Writer went over AVS with the patient and the patient's son. Both verbalize understanding of discharge instructions and follow up appointment. Patient was provided with compression stockings. PIV removed and personal belongings packed. Patient taken down via WC.   
medical record reflects the following:  Risk Factors: shopping today and reaching the checkout line when she felt hot,   sweating, dizzy, slightly upset, had stomach discomfort and nausea and all of   this happened gradually over 5 to 10 seconds.  Clinical Indicators: Orthostatics were attempted on multiple occasions where   patient began to vomit after being set up and had to be resumed at a later   time.  On day of discharge patient was able to get orthostatics with blood   pressure 160/85 sitting, 158/83 lying down, and 134/87 while standing.  Creatinine 1.0-0.8  2/25 IM:  Syncope workup has been negative for cardiac etiology, neurologic   etiology or electrolyte related.  Syncope likely secondary to dehydration.    Treatment: Encourage drinking water, wear compression stockings, f/u PCP,   orthostatic BP, IVF, lab monitoring    Thank you in advance for your time and consideration! Please reach out for any   questions.  Mallory Zuñiga RN, CCDS, CRCR Supervisor [phone]  Options provided:  -- Syncope due to orthostatic hypotension secondary to dehydration  -- Syncope due to orthostatic hypotension  -- Other - I will add my own diagnosis  -- Disagree - Not applicable / Not valid  -- Disagree - Clinically unable to determine / Unknown  -- Refer to Clinical Documentation Reviewer    PROVIDER RESPONSE TEXT:    The syncope is due to orthostatic hypotension secondary to dehydration.    Query created by: Mallory Zuñiga on 2/28/2024 12:18 PM      Electronically signed by:  Edi Lama MD 2/29/2024 9:22 AM          
functional limits  Coordination: Generally decreased, functional (reports difficulty with buttons and tying due to contractures, patient able to baltazar socks and grasp RW during ambulation)    ADL  Feeding: Independent  Grooming: Stand by assistance  Grooming Skilled Clinical Factors: clinical judgement based on strength, balance and functional mobility  UE Bathing: Stand by assistance  UE Bathing Skilled Clinical Factors: clinical judgement based on strength, balance and functional mobility  LE Bathing: Contact guard assistance;Stand by assistance  LE Bathing Skilled Clinical Factors: clinical judgement based on strength, balance and functional mobility  UE Dressing: Stand by assistance;Contact guard assistance  UE Dressing Skilled Clinical Factors: clinical judgement based on strength, balance and functional mobility  LE Dressing: Stand by assistance  LE Dressing Skilled Clinical Factors: doff/don slipper socks  Toileting: Stand by assistance  Toileting Skilled Clinical Factors: clinical judgement based on strength, balance and functional mobility  Functional Mobility: Stand by assistance;Contact guard assistance  Functional Mobility Skilled Clinical Factors: bed mobility IND, sit<>stand SBA, amb with RW CGA-SBA. Patient moves very quickly, requires cues for safety  Skin Care: N/A     Activity Tolerance  Activity Tolerance: Patient limited by pain;Patient limited by endurance  Activity Tolerance Comments: sitting rest breaks provided  Bed mobility  Supine to Sit: Independent  Scooting: Independent  Bed Mobility Comments: head of bed flat    Transfers  Sit to stand: Stand by assistance  Stand to sit: Stand by assistance  Transfer Comments: impulsive, cues for hand placement    Vision  Vision: Impaired  Vision Exceptions: Wears glasses for reading;Wears glasses for distance  Hearing  Hearing: Exceptions to WFL  Hearing Exceptions: Hard of hearing/hearing concerns (R ear)    Cognition  Overall Cognitive Status: 
parietooccipital region. 3.  Smaller area of patchy hypoattenuation in the right occipital lobe could represent an acute/subacute versus chronic infarct and could be further evaluated with follow-up brain MRI. CT CERVICAL SPINE: 1.  No acute abnormality in the cervical spine. 2.  Multilevel degenerative changes in the cervical spine are centered at C5-C6.     XR CHEST PORTABLE    Result Date: 2/23/2024  Bibasilar atelectasis versus airspace disease.       Physical Examination:   Physical Exam  Vitals and nursing note reviewed.   HENT:      Head: Normocephalic and atraumatic.      Comments: Staples intact     Nose: Nose normal.   Eyes:      General: Vision grossly intact.   Cardiovascular:      Rate and Rhythm: Normal rate and regular rhythm.      Heart sounds: Normal heart sounds. No murmur heard.     No gallop.   Pulmonary:      Effort: Pulmonary effort is normal. No respiratory distress.   Abdominal:      General: There is no distension.      Palpations: Abdomen is soft. There is no mass.      Tenderness: There is no abdominal tenderness. There is no guarding or rebound.      Hernia: No hernia is present.         Assessment:     Hospital Problems             Last Modified POA    * (Principal) Syncope and collapse 2/23/2024 Yes    Broca's aphasia 2/24/2024 Yes    UTI (urinary tract infection) 2/23/2024 Yes    Nausea 2/24/2024 Yes    History of cerebrovascular accident (CVA) with residual deficit 2/24/2024 Yes    Acute nonintractable headache 2/24/2024 Yes    Hypokalemia 2/24/2024 Yes     Plan:     Syncope and collapse  History of CVA with residual deficit  Broca's aphasia  Continue ASA 81mg  Orthostatic vitals, today if will tolerate  PT/OT consulted w/ recommendations appreciated    Hypokalemia,   3.6 today, will replace per electrolyte replacement scale    Urinary tract infection  Ceftriaxone day 3  UCx NGTD       Telemetry: NSR  Anticoagulation: Held  Dispo: home, lives with son  Diet: ADULT DIET; 
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  Leisure & Hobbies: play bingo with girlfriends; watch TV show  Vision/Hearing  Vision  Vision: Impaired  Vision Exceptions: Wears glasses for reading;Wears glasses for distance  Hearing  Hearing: Exceptions to WFL  Hearing Exceptions: Hard of hearing/hearing concerns (R ear)    Cognition   Orientation  Overall Orientation Status: Within Functional Limits  Orientation Level: Oriented X4;Oriented to place;Oriented to time;Oriented to situation;Oriented to person  Cognition  Overall Cognitive Status: Exceptions  Arousal/Alertness: Appropriate responses to stimuli  Following Commands: Follows all commands without difficulty  Attention Span: Appears intact  Memory: Appears intact  Safety Judgement: Decreased awareness of need for safety  Problem Solving: Decreased awareness of errors  Insights: Decreased awareness of deficits  Initiation: Does not require cues  Sequencing: Requires cues for some     Objective         Gross Assessment  AROM: Within functional limits  PROM: Within functional limits  Strength: Within functional limits  Coordination: Generally decreased, functional  Tone: Normal     AROM RLE (degrees)  RLE AROM: WNL  AROM LLE (degrees)  LLE AROM : WNL  AROM RUE (degrees)  RUE AROM : WFL  RUE General AROM: WFL except hand, refer to OT for details  AROM LUE (degrees)  LUE AROM : WFL  LUE General AROM: WFL except hand, refer to OT for details  Strength RLE  Strength RLE: WFL  R Hip Flexion: 5/5  R Hip ABduction: 5/5  R Knee Flexion: 5/5  R Knee Extension: 5/5  R Ankle Dorsiflexion: 5/5  R Ankle Plantar flexion: 5/5  Strength LLE  Strength LLE: WFL  L Hip Flexion: 5/5  L Hip ABduction: 5/5  L Knee Flexion: 5/5  L 
to monitor fluid status.  Working diagnosis urinary tract infection which caused mild dehydration and syncopal episode.  Continue observation and treatment of UTI anticipate resolution of orthostatic hypotension.  I have seen and discussed the care of Dolly Messiperry  including pertinent history and exam findings, with the resident. I have reviewed the key elements of all parts of the encounter with the resident at the time of the encounter. I agree with the assessment, plan and orders as documented by the resident.    Edi Lama MD   2/24/2024  6:30 PM

## 2024-03-01 ENCOUNTER — OFFICE VISIT (OUTPATIENT)
Dept: FAMILY MEDICINE CLINIC | Age: 84
End: 2024-03-01

## 2024-03-01 VITALS
DIASTOLIC BLOOD PRESSURE: 140 MMHG | HEIGHT: 62 IN | HEART RATE: 113 BPM | SYSTOLIC BLOOD PRESSURE: 180 MMHG | OXYGEN SATURATION: 99 % | WEIGHT: 142.2 LBS | BODY MASS INDEX: 26.17 KG/M2

## 2024-03-01 DIAGNOSIS — E87.6 HYPOKALEMIA: ICD-10-CM

## 2024-03-01 DIAGNOSIS — I10 PRIMARY HYPERTENSION: ICD-10-CM

## 2024-03-01 DIAGNOSIS — E55.9 VITAMIN D DEFICIENCY: ICD-10-CM

## 2024-03-01 DIAGNOSIS — Z48.02 VISIT FOR SUTURE REMOVAL: ICD-10-CM

## 2024-03-01 DIAGNOSIS — I63.9 ACUTE CVA (CEREBROVASCULAR ACCIDENT) (HCC): ICD-10-CM

## 2024-03-01 DIAGNOSIS — E78.2 MIXED HYPERLIPIDEMIA: ICD-10-CM

## 2024-03-01 DIAGNOSIS — R55 SYNCOPE AND COLLAPSE: Primary | ICD-10-CM

## 2024-03-01 DIAGNOSIS — I95.1 ORTHOSTATIC HYPOTENSION: ICD-10-CM

## 2024-03-01 DIAGNOSIS — S01.01XD LACERATION OF SCALP, SUBSEQUENT ENCOUNTER: ICD-10-CM

## 2024-03-01 DIAGNOSIS — Z91.81 AT HIGH RISK FOR FALLS: ICD-10-CM

## 2024-03-01 DIAGNOSIS — R47.9 SPEECH PROBLEM: ICD-10-CM

## 2024-03-01 DIAGNOSIS — Z09 HOSPITAL DISCHARGE FOLLOW-UP: ICD-10-CM

## 2024-03-01 PROBLEM — R11.0 NAUSEA: Status: RESOLVED | Noted: 2024-02-24 | Resolved: 2024-03-01

## 2024-03-01 RX ORDER — AMLODIPINE BESYLATE 5 MG/1
5 TABLET ORAL DAILY
Qty: 30 TABLET | Refills: 0 | Status: SHIPPED | OUTPATIENT
Start: 2024-03-01

## 2024-03-01 RX ORDER — ATORVASTATIN CALCIUM 20 MG/1
20 TABLET, FILM COATED ORAL DAILY
Qty: 30 TABLET | Refills: 3 | Status: SHIPPED | OUTPATIENT
Start: 2024-03-01

## 2024-03-01 SDOH — ECONOMIC STABILITY: HOUSING INSECURITY
IN THE LAST 12 MONTHS, WAS THERE A TIME WHEN YOU DID NOT HAVE A STEADY PLACE TO SLEEP OR SLEPT IN A SHELTER (INCLUDING NOW)?: NO

## 2024-03-01 SDOH — ECONOMIC STABILITY: FOOD INSECURITY: WITHIN THE PAST 12 MONTHS, THE FOOD YOU BOUGHT JUST DIDN'T LAST AND YOU DIDN'T HAVE MONEY TO GET MORE.: NEVER TRUE

## 2024-03-01 SDOH — ECONOMIC STABILITY: FOOD INSECURITY: WITHIN THE PAST 12 MONTHS, YOU WORRIED THAT YOUR FOOD WOULD RUN OUT BEFORE YOU GOT MONEY TO BUY MORE.: NEVER TRUE

## 2024-03-01 SDOH — ECONOMIC STABILITY: INCOME INSECURITY: HOW HARD IS IT FOR YOU TO PAY FOR THE VERY BASICS LIKE FOOD, HOUSING, MEDICAL CARE, AND HEATING?: NOT HARD AT ALL

## 2024-03-01 ASSESSMENT — ENCOUNTER SYMPTOMS
ABDOMINAL PAIN: 0
BACK PAIN: 1
TROUBLE SWALLOWING: 0
DIARRHEA: 0
STRIDOR: 0
SHORTNESS OF BREATH: 0
BLOOD IN STOOL: 0
SORE THROAT: 0
VOMITING: 0
EYE REDNESS: 0
CONSTIPATION: 0
SINUS PRESSURE: 0
NAUSEA: 0
COLOR CHANGE: 0
ABDOMINAL DISTENTION: 0
COUGH: 0
RECTAL PAIN: 0
CHEST TIGHTNESS: 0
WHEEZING: 0
RHINORRHEA: 0

## 2024-03-01 ASSESSMENT — PATIENT HEALTH QUESTIONNAIRE - PHQ9
SUM OF ALL RESPONSES TO PHQ QUESTIONS 1-9: 0
SUM OF ALL RESPONSES TO PHQ9 QUESTIONS 1 & 2: 0
1. LITTLE INTEREST OR PLEASURE IN DOING THINGS: 0
2. FEELING DOWN, DEPRESSED OR HOPELESS: 0
SUM OF ALL RESPONSES TO PHQ QUESTIONS 1-9: 0

## 2024-03-01 NOTE — PATIENT INSTRUCTIONS
results and important health updates, keeping you well-informed and engaged in your healthcare journey.    5. Increased engagement and collaboration: Direct scheduling encourages greater patient engagement and empowers you to take an active role in managing your healthcare. By accessing the [facility] Patient Portal, you can securely message your healthcare provider, ask questions, request prescription refills, and seek medical advice whenever you need it.    To get started with direct scheduling through the [facility] Patient Portal or to register with [facility], simply visit [email or web address].     We understand that change can sometimes be overwhelming, but we assure you that adopting direct scheduling through the [facility] Patient Portal will enhance your healthcare experience and put you in control of your appointments. Our dedicated staff is available to answer any questions or provide assistance throughout the process.    Thank you for entrusting us with your healthcare needs. We are committed to continuously improving our services and ensuring your satisfaction. Together, let's embrace the future of healthcare convenience and accessibility through direct scheduling on the [facility] Patient Portal.    Wishing you good health and happiness,    []

## 2024-03-01 NOTE — PROGRESS NOTES
Visit Information    Have you changed or started any medications since your last visit including any over-the-counter medicines, vitamins, or herbal medicines? no   Are you having any side effects from any of your medications? -  no  Have you stopped taking any of your medications? Is so, why? -  no    Have you seen any other physician or provider since your last visit? No  Have you had any other diagnostic tests since your last visit? No  Have you been seen in the emergency room and/or had an admission to a hospital since we last saw you? No  Have you had your routine dental cleaning in the past 6 months? yes -     Have you activated your [facility] account? If not, what are your barriers? Yes     Patient Care Team:  Bandar Guerrero MD as PCP - General (Family Medicine)  Bandar Guerrero MD as PCP - Empaneled Provider    Medical History Review  Past Medical, Family, and Social History reviewed and does contribute to the patient presenting condition    Health Maintenance   Topic Date Due    Depression Screen  Never done    Shingles vaccine (1 of 2) Never done    DEXA (modify frequency per FRAX score)  Never done    Respiratory Syncytial Virus (RSV) Pregnant or age 60 yrs+ (1 - 1-dose 60+ series) Never done    Pneumococcal 65+ years Vaccine (1 - PCV) Never done    Lipids  05/12/2021    Flu vaccine (1) 08/01/2023    COVID-19 Vaccine (4 - 2023-24 season) 09/01/2023    DTaP/Tdap/Td vaccine (2 - Td or Tdap) 02/23/2034    Hepatitis A vaccine  Aged Out    Hepatitis B vaccine  Aged Out    Hib vaccine  Aged Out    Polio vaccine  Aged Out    Meningococcal (ACWY) vaccine  Aged Out

## 2024-03-01 NOTE — PROGRESS NOTES
Post-Discharge Transitional Care  Follow Up      Dolly Guzmán   YOB: 1940    Date of Office Visit:  3/1/2024  Date of Hospital Admission: 2/23/24  Date of Hospital Discharge: 2/25/24  Risk of hospital readmission (high >=14%. Medium >=10%) :Readmission Risk Score: 10.2      Care management risk score Rising risk (score 2-5) and Complex Care (Scores >=6): No Risk Score On File     Non face to face  following discharge, date last encounter closed (first attempt may have been earlier): 02/26/2024    Call initiated 2 business days of discharge: Yes    ASSESSMENT/PLAN:   Syncope and collapse  Likely due to dehydration, labs reviewed, had mild low potassium which has been replaced.  Currently not take any medications.  -     Magnesium; Future  -     Vitamin B12 & Folate; Future  Primary hypertension  Blood pressure is fluctuating, start on low-dose amlodipine discussed with patient to use compression stockings at home and continues to use walker.  Discussed to take some time to stand.  Nurse visit in 1 week for BP check  -     amLODIPine (NORVASC) 5 MG tablet; Take 1 tablet by mouth daily, Disp-30 tablet, R-0Normal  At high risk for falls  Patient counseled has home health continue physical therapy, continue using walker  -     Vitamin B12 & Folate; Future  Acute CVA (cerebrovascular accident) (HCC)  Chronic history no recent stroke, start on low-dose Lipitor.  -     Lipid Panel; Future  -     atorvastatin (LIPITOR) 20 MG tablet; Take 1 tablet by mouth daily, Disp-30 tablet, R-3Normal  Mixed hyperlipidemia  Start on low-dose Lipitor recheck lipid panel  Orthostatic hypotension  Patient blood pressure is fluctuating, start on low-dose amlodipine, continue using compression stockings.  Laceration of scalp, subsequent encounter  For sutures removed.  -      - IA REMOVAL OF SUTURES  Hypokalemia  Improved on recent blood work  Speech problem  Visit for suture removal  Hospital discharge follow-up  -     IA